# Patient Record
Sex: FEMALE | Race: WHITE | NOT HISPANIC OR LATINO | Employment: FULL TIME | URBAN - METROPOLITAN AREA
[De-identification: names, ages, dates, MRNs, and addresses within clinical notes are randomized per-mention and may not be internally consistent; named-entity substitution may affect disease eponyms.]

---

## 2018-06-27 ENCOUNTER — LAB REQUISITION (OUTPATIENT)
Dept: LAB | Facility: HOSPITAL | Age: 28
End: 2018-06-27

## 2018-06-27 DIAGNOSIS — Z00.00 ROUTINE GENERAL MEDICAL EXAMINATION AT A HEALTH CARE FACILITY: ICD-10-CM

## 2018-06-27 LAB — HCG INTACT+B SERPL-ACNC: 153 MIU/ML

## 2018-06-27 PROCEDURE — 84702 CHORIONIC GONADOTROPIN TEST: CPT | Performed by: OBSTETRICS & GYNECOLOGY

## 2018-06-29 ENCOUNTER — LAB REQUISITION (OUTPATIENT)
Dept: LAB | Facility: HOSPITAL | Age: 28
End: 2018-06-29

## 2018-06-29 DIAGNOSIS — Z00.00 ROUTINE GENERAL MEDICAL EXAMINATION AT A HEALTH CARE FACILITY: ICD-10-CM

## 2018-06-29 LAB — HCG INTACT+B SERPL-ACNC: 234 MIU/ML

## 2018-06-29 PROCEDURE — 84702 CHORIONIC GONADOTROPIN TEST: CPT | Performed by: OBSTETRICS & GYNECOLOGY

## 2018-07-03 ENCOUNTER — LAB REQUISITION (OUTPATIENT)
Dept: LAB | Facility: HOSPITAL | Age: 28
End: 2018-07-03

## 2018-07-03 DIAGNOSIS — Z00.00 ROUTINE GENERAL MEDICAL EXAMINATION AT A HEALTH CARE FACILITY: ICD-10-CM

## 2018-07-03 LAB
ALBUMIN SERPL-MCNC: 4.29 G/DL (ref 3.2–4.8)
ALBUMIN/GLOB SERPL: 1.4 G/DL (ref 1.5–2.5)
ALP SERPL-CCNC: 62 U/L (ref 25–100)
ALT SERPL W P-5'-P-CCNC: 10 U/L (ref 7–40)
ANION GAP SERPL CALCULATED.3IONS-SCNC: 7 MMOL/L (ref 3–11)
AST SERPL-CCNC: 16 U/L (ref 0–33)
BILIRUB SERPL-MCNC: 0.7 MG/DL (ref 0.3–1.2)
BUN BLD-MCNC: 11 MG/DL (ref 9–23)
BUN/CREAT SERPL: 12 (ref 7–25)
CALCIUM SPEC-SCNC: 9.2 MG/DL (ref 8.7–10.4)
CHLORIDE SERPL-SCNC: 103 MMOL/L (ref 99–109)
CO2 SERPL-SCNC: 29 MMOL/L (ref 20–31)
CREAT BLD-MCNC: 0.92 MG/DL (ref 0.6–1.3)
GFR SERPL CREATININE-BSD FRML MDRD: 74 ML/MIN/1.73
GLOBULIN UR ELPH-MCNC: 3 GM/DL
GLUCOSE BLD-MCNC: 83 MG/DL (ref 70–100)
POTASSIUM BLD-SCNC: 4.1 MMOL/L (ref 3.5–5.5)
PROT SERPL-MCNC: 7.3 G/DL (ref 5.7–8.2)
SODIUM BLD-SCNC: 139 MMOL/L (ref 132–146)

## 2018-07-03 PROCEDURE — 80053 COMPREHEN METABOLIC PANEL: CPT | Performed by: OBSTETRICS & GYNECOLOGY

## 2018-07-09 ENCOUNTER — LAB REQUISITION (OUTPATIENT)
Dept: LAB | Facility: HOSPITAL | Age: 28
End: 2018-07-09

## 2018-07-09 DIAGNOSIS — Z00.00 ROUTINE GENERAL MEDICAL EXAMINATION AT A HEALTH CARE FACILITY: ICD-10-CM

## 2018-07-09 LAB — HCG INTACT+B SERPL-ACNC: 955 MIU/ML

## 2018-07-09 PROCEDURE — 84702 CHORIONIC GONADOTROPIN TEST: CPT | Performed by: OBSTETRICS & GYNECOLOGY

## 2018-07-16 ENCOUNTER — LAB REQUISITION (OUTPATIENT)
Dept: LAB | Facility: HOSPITAL | Age: 28
End: 2018-07-16

## 2018-07-16 DIAGNOSIS — Z00.00 ROUTINE GENERAL MEDICAL EXAMINATION AT A HEALTH CARE FACILITY: ICD-10-CM

## 2018-07-16 LAB — HCG INTACT+B SERPL-ACNC: 391 MIU/ML

## 2018-07-16 PROCEDURE — 84702 CHORIONIC GONADOTROPIN TEST: CPT | Performed by: OBSTETRICS & GYNECOLOGY

## 2019-12-29 PROBLEM — J11.1 INFLUENZA: Status: ACTIVE | Noted: 2019-12-29

## 2019-12-29 PROBLEM — J01.00 ACUTE NON-RECURRENT MAXILLARY SINUSITIS: Status: ACTIVE | Noted: 2019-12-29

## 2019-12-29 PROBLEM — T78.40XA ALLERGIC REACTION: Status: ACTIVE | Noted: 2019-12-29

## 2019-12-29 PROBLEM — J01.90 ACUTE SINUSITIS: Status: ACTIVE | Noted: 2019-12-29

## 2021-02-08 PROCEDURE — U0004 COV-19 TEST NON-CDC HGH THRU: HCPCS | Performed by: FAMILY MEDICINE

## 2021-02-09 ENCOUNTER — TELEPHONE (OUTPATIENT)
Dept: URGENT CARE | Facility: CLINIC | Age: 31
End: 2021-02-09

## 2021-04-06 ENCOUNTER — OFFICE VISIT (OUTPATIENT)
Dept: OBSTETRICS AND GYNECOLOGY | Facility: CLINIC | Age: 31
End: 2021-04-06

## 2021-04-06 VITALS
TEMPERATURE: 97.3 F | DIASTOLIC BLOOD PRESSURE: 72 MMHG | HEIGHT: 70 IN | SYSTOLIC BLOOD PRESSURE: 110 MMHG | BODY MASS INDEX: 26.05 KG/M2 | WEIGHT: 182 LBS

## 2021-04-06 DIAGNOSIS — Z00.00 ANNUAL PHYSICAL EXAM: Primary | ICD-10-CM

## 2021-04-06 PROCEDURE — 99395 PREV VISIT EST AGE 18-39: CPT | Performed by: OBSTETRICS & GYNECOLOGY

## 2021-04-06 NOTE — PROGRESS NOTES
GYN Annual Exam     CC - Here for annual exam. Patient is a  Established patient.    Subjective   HPI  Kia Allan is a 30 y.o. female, , who presents for annual well woman exam.   Patient's last menstrual period was 2021 (exact date)..  Periods are regular and last 7 days.    Dysmenorrhea:severe, occurring premenstrually and first 1-2 days of flow.  Patient reports problems with: none.    Partner Status: Marital Status: .  New Partners since last visit: yes.  Desires STD Screening: no.  HPV vaccinated? : yes    Additional OB/GYN History   Current contraception: contraceptive methods: calendar method, ok if becomes pregnant  Desires to: continue contraception  Last Pap : 2018 negative  Last Completed Pap Smear       Status Date      PAP SMEAR Done 2018 Negative, GC/Chl Negative, Yeast +        History of abnormal Pap smear: yes - patient states she has a history of abnormal pap  Family history of uterine, colon, breast, or ovarian cancer: yes - MGGM  Performs monthly Self-Breast Exam: yes  Exercises Regularly:no  Feelings of Anxiety or Depression: no  Tobacco Usage?: No   OB History        3    Para        Term                AB   3    Living           SAB   2    TAB        Ectopic        Molar        Multiple        Live Births                    Health Maintenance   Topic Date Due   • Annual Gynecologic Pelvic and Breast Exam  Never done   • ANNUAL PHYSICAL  Never done   • HEPATITIS C SCREENING  Never done   • INFLUENZA VACCINE  2021   • PAP SMEAR  2021   • TDAP/TD VACCINES (2 - Td) 07/10/2029   • Pneumococcal Vaccine 0-64  Aged Out   • MENINGOCOCCAL VACCINE  Aged Out       The additional following portions of the patient's history were reviewed and updated as appropriate: allergies, current medications, past family history, past medical history, past social history, past surgical history and problem list.    Review of Systems   All other systems  "reviewed and are negative.      I have reviewed and agree with the HPI, ROS, and historical information as entered above. Cheryle Kaplan MD    Objective   /72   Temp 97.3 °F (36.3 °C)   Ht 177.8 cm (70\")   Wt 82.6 kg (182 lb)   LMP 03/20/2021 (Exact Date)   Breastfeeding No   BMI 26.11 kg/m²     Physical Exam    General:  well developed; well nourished  no acute distress  Skin:  No suspicious lesions seen  Thyroid: normal to inspection and palpation  Breasts:  Examined in supine position  Symmetric without masses or skin dimpling  Nipples normal without inversion, lesions or discharge  There are no palpable axillary nodes  CVS: RRR, no M/R/G, distal pulses wnl  Resp: CTAB, No W/R/R  Abdomen: soft, non-tender; no masses  no umbilical or inguinal hernias are present  no hepato-splenomegaly  Pelvis: Clinical staff was present for exam  External genitalia:  normal appearance of the external genitalia including Bartholin's and [place]'s glands.  Urethra: no masses or tenderness  Urethral meatus: normal size;  No lesions or signs of prolapse  Bladder: non tender to palpation, no masses, no prolapse  Vagina:  normal pink mucosa without prolapse or lesions.  Cervix:  normal appearance.  Uterus:  normal size, shape and consistency.  Adnexa:  normal bimanual exam of the adnexa.  Perineum/Anus: normal appearance, no external hemorrhoids  Ext: 2+ pulses, no edema    Assessment/Plan     Assessment     Problem List Items Addressed This Visit     Annual physical exam - Primary    Relevant Orders    Pap IG, Ct-Ng TV Rfx HPV ASCU          1. GYN annual well woman exam.     Plan     1. Reviewed Pap screening guidelines  2. Pap with HPV/G/C done  3. Reviewed monthly self breast exams.  Instructed to call with lumps, pain, or breast discharge.    4. Reviewed exercise and healthy diet as a preventative health measures.   5. RTC in 1 year or PRN with problems  6. Other: Encouraged PNVs as it sounds like will be ready to try " soon.      Cheryle Kaplan MD  04/06/2021

## 2021-04-13 DIAGNOSIS — Z00.00 ANNUAL PHYSICAL EXAM: ICD-10-CM

## 2021-04-22 ENCOUNTER — TELEPHONE (OUTPATIENT)
Dept: OBSTETRICS AND GYNECOLOGY | Facility: CLINIC | Age: 31
End: 2021-04-22

## 2021-04-22 DIAGNOSIS — N91.2 AMENORRHEA: Primary | ICD-10-CM

## 2021-09-20 ENCOUNTER — TELEPHONE (OUTPATIENT)
Dept: OBSTETRICS AND GYNECOLOGY | Facility: CLINIC | Age: 31
End: 2021-09-20

## 2021-09-20 ENCOUNTER — LAB (OUTPATIENT)
Dept: OBSTETRICS AND GYNECOLOGY | Facility: CLINIC | Age: 31
End: 2021-09-20

## 2021-09-20 DIAGNOSIS — Z87.59 HISTORY OF ECTOPIC PREGNANCY: ICD-10-CM

## 2021-09-20 DIAGNOSIS — Z34.90 PREGNANCY, UNSPECIFIED GESTATIONAL AGE: Primary | ICD-10-CM

## 2021-09-20 NOTE — TELEPHONE ENCOUNTER
"H/o MAB x 2 and ectopic in 2018. +UPT today. LMP 8/12/21. Mild cramping, \"stretching pains\". Denies bldg. MBT A positive per pt    Pt will come for HCG and progesterone today and repeat in 3 days. CB with bldg or severe pain.    "

## 2021-09-21 ENCOUNTER — TELEPHONE (OUTPATIENT)
Dept: OBSTETRICS AND GYNECOLOGY | Facility: CLINIC | Age: 31
End: 2021-09-21

## 2021-09-21 LAB
HCG INTACT+B SERPL-ACNC: NORMAL MIU/ML
PROGEST SERPL-MCNC: 18.6 NG/ML

## 2021-09-21 NOTE — TELEPHONE ENCOUNTER
Ok to go ahead and schedule new OB too please.  Beta high enough we should see something in uterus and with h/o ectopic, want to be sure IUP

## 2021-09-21 NOTE — TELEPHONE ENCOUNTER
S/w pt she states she has already s/w someone in our office in regards to lab results and has NOB appt scheduled.

## 2021-09-29 ENCOUNTER — INITIAL PRENATAL (OUTPATIENT)
Dept: OBSTETRICS AND GYNECOLOGY | Facility: CLINIC | Age: 31
End: 2021-09-29

## 2021-09-29 VITALS — SYSTOLIC BLOOD PRESSURE: 110 MMHG | WEIGHT: 189 LBS | BODY MASS INDEX: 27.12 KG/M2 | DIASTOLIC BLOOD PRESSURE: 72 MMHG

## 2021-09-29 DIAGNOSIS — Z34.81 PRENATAL CARE, SUBSEQUENT PREGNANCY, FIRST TRIMESTER: Primary | ICD-10-CM

## 2021-09-29 PROBLEM — J11.1 INFLUENZA: Status: RESOLVED | Noted: 2019-12-29 | Resolved: 2021-09-29

## 2021-09-29 PROBLEM — Z00.00 ANNUAL PHYSICAL EXAM: Status: RESOLVED | Noted: 2021-04-06 | Resolved: 2021-09-29

## 2021-09-29 PROCEDURE — 0501F PRENATAL FLOW SHEET: CPT | Performed by: OBSTETRICS & GYNECOLOGY

## 2021-09-29 RX ORDER — PROGESTERONE 100 MG/1
100 CAPSULE ORAL DAILY
Qty: 30 CAPSULE | Refills: 1 | Status: SHIPPED | OUTPATIENT
Start: 2021-09-29 | End: 2022-05-18 | Stop reason: HOSPADM

## 2021-09-29 RX ORDER — PRENATAL VIT/IRON FUM/FOLIC AC 27MG-0.8MG
TABLET ORAL DAILY
COMMUNITY

## 2021-09-29 RX ORDER — FERROUS SULFATE 325(65) MG
325 TABLET ORAL
COMMUNITY
End: 2022-05-18

## 2021-09-29 RX ORDER — ALBUTEROL SULFATE 90 UG/1
2 AEROSOL, METERED RESPIRATORY (INHALATION) EVERY 4 HOURS PRN
Qty: 8 G | Refills: 2 | Status: SHIPPED | OUTPATIENT
Start: 2021-09-29
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
## 2021-10-03 LAB
ABO GROUP BLD: NORMAL
AMPHETAMINES UR QL SCN: NEGATIVE NG/ML
BACTERIA UR CULT: NORMAL
BACTERIA UR CULT: NORMAL
BARBITURATES UR QL SCN: NEGATIVE NG/ML
BASOPHILS # BLD AUTO: 0 X10E3/UL (ref 0–0.2)
BASOPHILS NFR BLD AUTO: 1 %
BENZODIAZ UR QL SCN: NEGATIVE NG/ML
BLD GP AB SCN SERPL QL: NEGATIVE
BZE UR QL SCN: NEGATIVE NG/ML
C TRACH RRNA SPEC QL NAA+PROBE: NEGATIVE
CANNABINOIDS UR QL SCN: NEGATIVE NG/ML
CREAT UR-MCNC: 41.1 MG/DL (ref 20–300)
EOSINOPHIL # BLD AUTO: 0.1 X10E3/UL (ref 0–0.4)
EOSINOPHIL NFR BLD AUTO: 1 %
ERYTHROCYTE [DISTWIDTH] IN BLOOD BY AUTOMATED COUNT: 12.2 % (ref 11.7–15.4)
HBV SURFACE AG SERPL QL IA: NEGATIVE
HCT VFR BLD AUTO: 39.4 % (ref 34–46.6)
HCV AB S/CO SERPL IA: <0.1 S/CO RATIO (ref 0–0.9)
HGB BLD-MCNC: 13.1 G/DL (ref 11.1–15.9)
HIV 1+2 AB+HIV1 P24 AG SERPL QL IA: NON REACTIVE
IMM GRANULOCYTES # BLD AUTO: 0 X10E3/UL (ref 0–0.1)
IMM GRANULOCYTES NFR BLD AUTO: 0 %
LABORATORY COMMENT REPORT: NORMAL
LYMPHOCYTES # BLD AUTO: 1.6 X10E3/UL (ref 0.7–3.1)
LYMPHOCYTES NFR BLD AUTO: 19 %
Lab: NORMAL
MCH RBC QN AUTO: 29.4 PG (ref 26.6–33)
MCHC RBC AUTO-ENTMCNC: 33.2 G/DL (ref 31.5–35.7)
MCV RBC AUTO: 88 FL (ref 79–97)
METHADONE UR QL SCN: NEGATIVE NG/ML
MONOCYTES # BLD AUTO: 0.7 X10E3/UL (ref 0.1–0.9)
MONOCYTES NFR BLD AUTO: 8 %
N GONORRHOEA RRNA SPEC QL NAA+PROBE: NEGATIVE
NEUTROPHILS # BLD AUTO: 6.1 X10E3/UL (ref 1.4–7)
NEUTROPHILS NFR BLD AUTO: 71 %
OPIATES UR QL SCN: NEGATIVE NG/ML
OXYCODONE+OXYMORPHONE UR QL SCN: NEGATIVE NG/ML
PCP UR QL: NEGATIVE NG/ML
PH UR: 7.3 [PH] (ref 4.5–8.9)
PLATELET # BLD AUTO: 305 X10E3/UL (ref 150–450)
PROPOXYPH UR QL SCN: NEGATIVE NG/ML
RBC # BLD AUTO: 4.46 X10E6/UL (ref 3.77–5.28)
RH BLD: POSITIVE
RPR SER QL: NON REACTIVE
RUBV IGG SERPL IA-ACNC: 4.14 INDEX
WBC # BLD AUTO: 8.6 X10E3/UL (ref 3.4–10.8)

## 2021-11-08 ENCOUNTER — ROUTINE PRENATAL (OUTPATIENT)
Dept: OBSTETRICS AND GYNECOLOGY | Facility: CLINIC | Age: 31
End: 2021-11-08

## 2021-11-08 VITALS — WEIGHT: 189.2 LBS | SYSTOLIC BLOOD PRESSURE: 118 MMHG | DIASTOLIC BLOOD PRESSURE: 68 MMHG | BODY MASS INDEX: 27.15 KG/M2

## 2021-11-08 DIAGNOSIS — Z3A.12 12 WEEKS GESTATION OF PREGNANCY: Primary | ICD-10-CM

## 2021-11-08 LAB
GLUCOSE UR STRIP-MCNC: NEGATIVE MG/DL
PROT UR STRIP-MCNC: NEGATIVE MG/DL

## 2021-11-08 PROCEDURE — 0502F SUBSEQUENT PRENATAL CARE: CPT | Performed by: OBSTETRICS & GYNECOLOGY

## 2021-11-08 RX ORDER — ERGOCALCIFEROL (VITAMIN D2) 10 MCG
400 TABLET ORAL DAILY
COMMUNITY
End: 2022-05-18

## 2021-11-08 RX ORDER — MULTIVIT WITH MINERALS/LUTEIN
250 TABLET ORAL DAILY
COMMUNITY
End: 2022-05-18

## 2021-11-08 NOTE — PROGRESS NOTES
OB FOLLOW UP    Kia Allan is a 31 y.o.  12w4d patient being seen today for her obstetrical follow up visit. Patient reports mild cramping for the past couple of days, but subsided last night. She also reports that she is having trouble eating meat due to the thought of it. She denies any other issues or concerns at today's visit.     Her prenatal care is complicated by (and status) :    Patient Active Problem List   Diagnosis   • Acute non-recurrent maxillary sinusitis   • Allergic reaction   • Prenatal care, subsequent pregnancy, first trimester   • 12 weeks gestation of pregnancy       ROS -   Patient Reports : cramping  Patient Denies: Loss of Fluid, Vaginal Spotting, Vision Changes and Headaches  Fetal Movement : absent      /68   Wt 85.8 kg (189 lb 3.2 oz)   LMP 2021 (Exact Date)   BMI 27.15 kg/m²     Ultrasound Today: no    EXAM:  Vitals: See prenatal flowsheet   Abdomen: See prenatal flowsheet   Urine glucose/protein: See prenatal flowsheet   Pelvic: See prenatal flowsheet     Assessment    Diagnoses and all orders for this visit:    1. 12 weeks gestation of pregnancy (Primary)  -     POC Urinalysis Dipstick  -     [id number] PLUS Core+SCA+ESS - Blood,        1. Pregnancy at 12w4d  2. Fetal status reassuring     Problem List Items Addressed This Visit     12 weeks gestation of pregnancy - Primary    Relevant Orders    POC Urinalysis Dipstick (Completed)    [id number] PLUS Core+SCA+ESS - Blood, (Completed)          Plan    1. Fetal movement/ Labor Precautions  2. Cannot get vaccination  Diet/exercise precautions  Discussed NIPT vs. 2nd trimester screen and patient wants [id number] today  Follow Up: Return in about 4 weeks (around 2021) for Next scheduled follow up.        Cheryle Kaplan MD  2021

## 2021-11-13 ENCOUNTER — E-VISIT (OUTPATIENT)
Dept: FAMILY MEDICINE CLINIC | Facility: TELEHEALTH | Age: 31
End: 2021-11-13

## 2021-11-13 PROCEDURE — BRIGHTMDVISIT: Performed by: NURSE PRACTITIONER

## 2021-11-13 NOTE — EXTERNAL PATIENT INSTRUCTIONS
Note   The common cold can be managed with over the counter medications, however, you will need to contact your OB-GYN to determine which medications will be acceptable to take during your pregnancy.   Diagnosis   Common cold   My name is Tarsha Lindsey, and I'm a healthcare provider at [facility]. I carefully reviewed the symptoms you reported in your interview, and I see that you may have a cold.   With the ongoing COVID-19 pandemic, it can be hard to tell the difference between the common cold and a COVID-19 infection. Many cold symptoms are the same as COVID-19 symptoms. Most people with either illness have mild to moderate symptoms and can rest at home until they get better.   To prevent the spread of illness to others, I recommend that you stay home and away from other people as much as possible while you're sick.   Everyone 5 years of age and older can now get a COVID-19 vaccination.   I encourage you to get the COVID-19 vaccine as soon as possible. COVID-19 vaccines are SAFE and EFFECTIVE. Getting vaccinated against COVID-19 is the best way to protect yourself, your loved ones, and your community.   Since December 2020, more than 357 million doses of the COVID-19 vaccine have been given in the [place]. Serious side effects are extremely rare, and no long-term side effects have been reported. If you still have questions or concerns about the COVID-19 vaccine, please speak with a healthcare provider.   For more information about how to get a COVID-19 vaccine, visit [facility]'s website. To find a COVID-19 vaccination site near you, call [phone], or text your zip code to 979101 ([email or web address]). Message and data rates may apply.   Here are the main things to know about your current symptoms:    Colds get better on their own.    You can use the medications I recommend here to help ease your symptoms.   Based on what you told me in your interview, I haven't prescribed any antibiotics. Antibiotics  fight bacteria, not viruses. They don't help when you have a viral infection like the common cold. Antibiotics could even make you feel worse as they can cause or worsen nausea, diarrhea, and stomach pain. The following factors suggest that a virus is causing your symptoms:    You've had symptoms for less than 10 days. A bacterial infection is suspected when you've had symptoms longer than 10 days without improvement.    In addition to your sore throat, you have other cold symptoms like a stuffy nose or cough. This suggests a viral infection, rather than a bacterial infection such as strep throat.   Medications won't cure the cold. However, they may help you feel better while your immune system fights the virus.   Pregnancy and breastfeeding   The medications in this treatment plan are safe to take while pregnant.   About your diagnosis   The common cold is a viral infection of the respiratory tract, which includes the nose, throat, breathing passages, and lungs. These are the key things to know about colds:    There is no vaccine to prevent colds and no cure for a cold.    Some medications can lessen your symptoms.    You can spread a cold to other people.    Over 200 different viruses can cause the common cold. That's why you can get another cold after you've just had one.   Common symptoms include low-grade fever, sneezing, runny nose, congestion, sore throat, and cough. You may also notice fatigue, body aches, difficulty sleeping, or decreased appetite.   What to expect   You should feel better within 5 to 7 days and be back to normal within 14 days.   You can return to your normal activities when ALL of the following are true:    You've been fever-free for more than 24 hours without using fever-reducing medications such as Tylenol    Your other symptoms have improved    It's been at least 10 days since your symptoms first started   When to seek care   Call us at [phone]   with any sudden or unexpected  symptoms.    Fever that measures over 103F or continues for more than 3 days.    A worsening headache.    Your throat pain worsens and makes swallowing extremely difficult or impossible.    Your hoarse voice or loss of voice lasts longer than 2 weeks.    Your sinus pain continues for 10 days or more, without improvement.    More than 5 episodes of diarrhea in a day.    More than 5 episodes of vomiting in a day.    Coughing up red or bloody mucus.    Severe shortness of breath.    Severe stomach pain.    Symptoms that get better for a few days, and then suddenly get worse.    Severe chest pain   Other treatment    Rest! Your body needs rest to recover and fight the virus.    Drink plenty of water to stay hydrated.    Use a clean humidifier or a cool-mist vaporizer in your room at night. Breathing humid air may help with nasal congestion and cough.    Try non-prescription saline nasal sprays to help your nasal symptoms.    Try using a Neti Pot to flush out your stuffy nose and sinuses. Neti Pots are available at any drugstore without a prescription.    Gargle with salt water several times a day to help your throat feel better. Cough drops and throat lozenges may provide extra relief. A teaspoon of honey stirred into warm water or weak tea can help soothe a sore throat and cough.    Avoid smoke and air pollution. Smoke can make infections worse.   Prevention    Avoid close contact with other people when you're sick.    Cover your mouth and nose when you cough or sneeze. Use a tissue or cough into your elbow. Make sure that used tissues go directly into the trash.    Avoid touching your eyes, nose, or mouth while you're sick.    Wash your hands often, especially after coughing, sneezing, or blowing your nose. If soap and water are not available, use an alcohol-based hand .    If you or someone in your home or workplace is sick, disinfect commonly used items. This includes door handles, tables, computers,  remotes, and pens.    Coronavirus (COVID-19) information   Common symptoms of COVID-19 include fever, cough, shortness of breath, fatigue, muscle or body aches, headaches, new loss of sense of taste or smell, sore throat, stuffy or runny nose, nausea or vomiting, and diarrhea. Most people who get COVID-19 have mild symptoms and can rest at home until they get better. Elderly people and those with chronic medical problems may be at risk for more serious complications.   FAQs about the COVID-19 vaccine   There are three authorized COVID-19 vaccines: [employer] Laurent Vaccine (J&J/Laurent), Moderna, and Pfizer-BioNTech (Pfizer). The J&J/Laurent and Moderna vaccines are approved for use in people aged 18 and older. The Pfizer vaccine is approved for those aged 5 and older. All three are available at no cost. Even if you don't have health insurance, you can still get the COVID-19 vaccine for free.   Which vaccine is the best? Which vaccine should I get?   All three vaccines are highly effective. Even if you get COVID after being vaccinated, all of the vaccines help prevent severe disease, hospitalization, and complications.   Most people should get whichever vaccine is first available to them. However, women younger than 50 years old should consider the rare risk of blood clots with low platelets after vaccination with the J&J/Laurent vaccine. This risk hasn't been seen with the other two vaccines.   Are the vaccines safe?   Yes. Hundreds of millions of people in the [place] have already safely received COVID-19 vaccines. As part of Phase 3 clinical trials in the [place] and other countries, researchers collected safety and efficacy data for all three vaccines. These clinical trials follow strict standards. Before a vaccine is approved, the manufacturing company must submit data to the Food and Drug Administration (FDA) for review. Tens of thousands of volunteers participated in the clinical trials for the vaccines. The  FDA continues to monitor safety data as the vaccines are given to the general population.   Do I need the vaccine if I've already had COVID?   Yes. Vaccination helps protect you even if you've already had COVID.   If you had COVID-19 and had symptoms, wait to get vaccinated until ALL of the following are true:    10 days since your symptoms started    24 hours with no fever, without the use of fever-reducing medications    Your other COVID-19 symptoms are improving   If you tested positive for COVID-19 but did not have symptoms, you can get vaccinated after 10 days have passed since you had a positive test, as long as you don't develop symptoms.   If you had COVID-19 and were treated with monoclonal antibodies, you should wait 90 days before getting a COVID-19 vaccination.   How many doses of the vaccine do I need?   J&J/Laurent: one dose.   Moderna: two doses, spaced 4 weeks apart.   Pfizer vaccine: two doses, spaced 3 weeks apart.   When am I considered fully vaccinated?   J&J/Laurent: 14 days after you get the shot.   Moderna: 14 days after your second dose.   Pfizer: 14 days after your second dose.   What if I miss the second dose of the Moderna or Pfizer vaccine?   Contact your healthcare provider to discuss your options. While one dose of the vaccine may provide some protection against COVID, you need both doses for maximum protection.   What are the common side effects of the vaccine?   A sore arm, tiredness, headache, and muscle pain may occur within two days of getting the vaccine and last a day or two. For the Moderna or Pfizer vaccines, side effects are more common after the second dose. People over the age of 55 are less likely to have side effects than younger people.   After I'm fully vaccinated, can I still get or spread COVID?   Yes, but your disease should be milder, and your risk of serious illness, hospitalization, and complications will be much lower. And being vaccinated reduces the risk of  "spreading the disease if you get it.   After I'm fully vaccinated, can I go back to normal?    You should still wear a mask indoors in public if:    It's required by laws, rules, regulations, or local guidance.    You have a weakened immune system.    Your age puts you at increased risk of severe disease.    You have a medical condition that puts you at increased risk of severe disease.    Someone in your household has a weakened immune system, is at increased risk for severe disease, or is unvaccinated.    You're in an area of high transmission.    For travel information, see the CDC's latest guidance  .    Even after you're fully vaccinated, you should still:    Get tested and stay away from others if you develop symptoms of COVID-19.    Stay home and away from other private or public settings if you've tested positive for COVID-19 in the previous 10 days.    Continue to follow any applicable laws, rules, and regulations.    If you're exposed to COVID-19 after being fully vaccinated, you should get tested 3 to 5 days after exposure, even if you don't have symptoms. Wear a mask indoors in public for 14 days following exposure, or until you've confirmed your test result is negative. If you test positive for COVID-19, you should isolate at home for 10 days.   I'm fully vaccinated but have heard about a \"third dose\" and \"fourth dose.\" Do these apply to me?   Third and fourth doses are needed for some immunocompromised people.   You should get a third dose if ALL of the following are true:    You have a moderately to severely weakened immune system    You've had two doses of the Moderna or Pfizer vaccine    It's been at least 28 days since your second dose   You should get a fourth dose if ALL of the following are true:    You have a moderately to severely weakened immune system    You've had three doses of the Moderna or Pfizer vaccine    It's been at least 6 months since your third dose   If any of these situations " "apply, you have a moderately to severely weakened immune system:    You're getting active cancer treatment for a cancer or tumor of the blood    You've had an organ transplant and are taking medicine to suppress your immune system    You've had a stem cell transplant within the last 2 years    You're taking medicine to suppress your immune system, such as high-dose corticosteroids    You have moderate to severe primary immunodeficiency (such as DiGeorge syndrome, Wiskott-Moni syndrome)    You have advanced or untreated HIV infection   If you think you need a third or fourth dose, speak with your care team.   I'm fully vaccinated but have heard about \"boosters.\" Do these apply to me?   A booster shot is a \"top-up\" for people whose immunity from vaccination may have lessened over time.   If you got the [employer] vaccine AND it's been at least 2 months since your shot, you should get a booster shot.   If you got the Pfizer or Moderna vaccine AND it's been at least 6 months since your second dose, you should get a booster shot if:    You're 65 or older    You're 18 or older and live in a long-term care facility    You're 18 or older and have an underlying medical condition, such as:    Cancer    Chronic kidney disease    Chronic lung disease (COPD, moderate to severe asthma, interstitial lung disease, cystic fibrosis, or pulmonary hypertension)    Dementia or other neurological condition    Diabetes    Down syndrome    Heart condition, including heart failure, coronary artery disease, cardiomyopathies, or hypertension    HIV infection    A weakened immune system    Chronic liver disease    A mental health condition, including depression and schizophrenia spectrum disorders    Obesity    Pregnancy    Sickle cell disease or thalassemia    Smoking, current or former    Solid organ or blood stem cell transplant    Stroke or cerebrovascular disease    Substance use disorder    You're 18 or older and work or live in " high-risk settings. This includes:    First responders (healthcare workers, firefighters, police)    Education staff (teachers, support staff,  workers)    Food and agriculture workers    Manufacturing workers    Corrections workers    [employer] workers    Public transit workers    Grocery store workers   If you need a booster shot, you can choose which vaccine to get. You can get the kind you originally received, or you can get a different kind. New CDC recommendations allow for mix-and-match dosing for booster shots. However, women younger than 50 years old should consider the rare risk of blood clots with low platelets after vaccination with the J&J/Laurent vaccine. This risk hasn't been seen with the other two vaccines.   If you think you need a booster shot, speak with your care team.   General information about COVID-19   What should I do if I'm exposed to someone with COVID-19?   In general, you need to be in close contact with someone who has COVID-19 to get infected. Close contact means:    Living in the same household as someone with COVID-19.    Caring for someone with COVID-19.    Being within 6 feet of someone with COVID-19 for a total of at least 15 minutes over a 24-hour period.    Being in direct contact with respiratory droplets from someone with COVID-19 (for example, being coughed on, kissing, or sharing utensils).   If you're exposed and not fully vaccinated:    Self-quarantine in your home for 14 days after your last known contact with the infected person. Because you can spread the disease before you have symptoms, it's very important that you stay home AT ALL TIMES, unless you need medical care. Don't go to work, school, or public places, including grocery stores and pharmacies. Avoid public transportation, ride-sharing, and taxis.    Watch for the common symptoms of COVID-19: fever, cough, shortness of breath, fatigue, muscle or body aches, headache, new loss of sense of taste  or smell, sore throat, stuffy or runny nose, nausea or vomiting, and diarrhea.   What if I develop symptoms of COVID-19?   CALL your healthcare provider or clinic right away to discuss next steps if you have any of the following risk factors:    Age 65 or older    Pregnant    Chronic medical condition such as diabetes, liver disease, kidney disease requiring dialysis, heart disease, high blood pressure, severe obesity, or lung disease (including moderate to severe asthma)    A medical condition that affects your immune system    Taking a medication that affects your immune system   Otherwise, if your symptoms are mild, you don't need to call your healthcare provider or be seen for an exam. You can recover at home and should feel better within a few weeks. Because COVID-19 is highly contagious, it's important that you avoid close contact with others while you're recovering. This means staying home AT ALL TIMES, unless you need medical care. Don't go to work, school, or public places, including grocery stores and pharmacies. Avoid public transportation, ride-sharing, and taxis.   There are currently no specific medications to treat this infection. Over-the-counter cold medications can help ease symptoms.   To prevent the spread of COVID-19 to the people and animals in your household:    Stay in a specific room away from other people in your home, and use a separate bathroom if possible.    Wear a mask when close contact with household members can't be avoided.   You can return to your normal activities when ALL of the following are true:    Your symptoms have improved    It's been at least 10 days since your symptoms first started    You've been fever-free for more than 24 hours without using fever-reducing medications such as Tylenol   When to get care   Call your healthcare provider immediately if you have any of the following:    Fever over 103F    Fever that doesn't come down after taking medications such as  Tylenol or ibuprofen    Fever that returns after being gone for more than 24 hours    Fever lasting more than 4 days    Worsening shortness of breath or difficulty breathing   Go to your nearest ER or call 911 if you have any of the following:    Shortness of breath that makes it hard to do simple things like get dressed, bathe, or comb your hair    Persistent chest pain or chest tightness    New confusion or difficulty staying alert    Bluish color to the lips or face    Flu vaccine information   Getting a flu vaccine this year is more important than ever. The vaccine not only protects you and the people around you from the flu, it also helps reduce the strain on healthcare systems responding to the COVID-19 pandemic.   Who should get a flu vaccine?   Everyone 6 months of age and older should get a yearly flu vaccine.   When should I get vaccinated?   You should get a flu vaccine by the end of October. Once you're vaccinated, it takes about two weeks for antibodies to develop and protect you against the flu. That's why it's important to get vaccinated as soon as possible.   After October, is it too late to get vaccinated?   No. You should still get vaccinated. As long as the flu viruses are still in your community, flu vaccines will remain available, even into January of next year or later.   Why do I need a flu vaccine EVERY year?   Flu viruses are constantly changing, so flu vaccines are usually updated from one season to the next. Your protection from the flu vaccine also lessens over time.   Is the flu vaccine safe?   Yes. Over the last 50 years, hundreds of millions of Americans have safely received the flu vaccines.   What are the side effects of flu vaccines?   You CANNOT get the flu from a flu vaccine. Common side effects of the flu shot include soreness, redness and/or swelling where the shot was given, low grade fever, and aches. Common side effects of the nasal spray flu vaccine for adults include runny  nose, headaches, sore throat, and cough. For children, side effects include wheezing, vomiting, muscle aches, and fever.   Does the flu vaccine increase your risk of getting COVID-19?   No. There is no evidence that getting a flu vaccine increases your risk of getting COVID-19.   Is it safe to get the flu vaccine along with a COVID-19 vaccine?   Yes. It's safe to get the flu vaccine with a COVID vaccine or booster.   Contact your healthcare provider TODAY for details on when and where to get your flu vaccine.   Your provider   Your diagnosis was provided by Tarsha Lindsey, a member of your trusted care team at [facility].   If you have any questions, call us at [phone]  .

## 2021-11-13 NOTE — E-VISIT TREATED
Chief Complaint: Coronavirus (COVID-19), cold, sinus pain, allergy, or flu   Patient introduction   Patient is 31-year-old female who reports congestion, rhinitis, sore throat, and voice hoarseness that started 3-5 days ago.   Patient has not requested COVID testing.   Coronavirus Disease 2019 (COVID-19) exposure, testing history, and vaccination status:    No known exposure to a confirmed or suspected case of COVID-19.    No recent travel outside of their local community.    Patient had a viral test > 3 months ago. Test result was negative.    Has not received a COVID-19 vaccination.   Warning. The following may warrant further investigation:    Pregnancy    Asthma   When asked why they're seeking care online today, patient reports they just want to feel better.   Patient-submitted comments:   Patient writes: *I'm allergic to Sulfa (didn't see it on the list). It's my yearly sinus infection but if I don't catch it then it will go into bronchitis. Thick green mucus with congestion. No fever. Slight cough (mostly when I feel stuff going down my throat) and the sore throat. Fluid intake and out put normal.   I am 13 weeks pregnant. Taking daily prenatal.*.   Patient did not request an excuse note.   General presentation   Symptoms came on gradually.   Fever:    Denies fever.   Sinus and nasal symptoms:    Reports rhinitis.    Reports green nasal drainage.    Nasal drainage is thick.    Reports postnasal drip.    Reports 1-3 episodes of antibiotic treatment for sinus infection in the last year.    Reports congestion with sinus pain or pressure on or around their cheeks.    Patient first noticed sinus pain < 5 days ago.    Sinus pain is not worse with Valsalva.    Denies itchy nose or sneezing.    Denies history of unhealed nasal septal ulcer/nasal wound.    Denies history of deviated septum or nasal polyps.   Sore throat:    Reports sore throat.    Patient does not think they have strep.    Reports discomfort when  swallowing but affirms ability to swallow liquids and solid foods.    Reports moderate hoarseness. Patient doesn't believe hoarseness is due to voice strain.   Head and body aches:    Denies headache.    Denies sweats.    Denies chills.    Denies myalgia.    Denies fatigue.   Dizziness:    Denies dizziness.   Cough:    Denies cough.   Wheezing and SOB:    Reports having asthma.    Reports wheezing.    Reports using an albuterol inhaler for asthma.    Reports using albuterol every 6 hours or longer.    Denies COPD diagnosis.    Denies shortness of breath.    Denies current cold symptoms aggravate their asthma.   Chest pain:    Denies chest pain.   Allergies:    Reports history of allergies.    Patient does not think symptoms are allergy-related.    Patient has known seasonal allergies and known dust allergies.   Flu exposure:    Denies receiving a flu vaccine this season.   Patient denies the following red flags:    Changes in alertness or awareness    Symptoms suggesting respiratory distress    Symptoms suggesting airway obstruction    Decreased urination   Risk factors for antibiotic resistance:    Antibiotic use for similar symptoms within the last 30 days   Pregnancy/menstrual status/breastfeeding:    Patient is pregnant    Denies breastfeeding   Self-exam:    No difficulty moving their chin toward their chest    Tonsillar edema    Tonsils appear normal    No palatal petechiae   Reports antibiotic treatment for similar symptoms within the past month.   Current medications   Reports taking over-the-counter medication for current symptoms. Patient has taken guaifenesin/dextromethorphan.   Reports taking FLUORIDE/IRON/VITAMIN A/VITAMIN C/VITAMIN D.   Medication contraindication review   Because of patient's reported pregnancy/menstrual status, the following medication(s) will not be prescribed: baloxavir, doxycycline hyclate, doxycycline monohydrate, fluconazole, ibuprofen,  acetaminophen-diphenhydramine-phenylephrine, and aspirin-chlorpheniramine-phenylephrine.   Denies history of anaphylactic reaction to beta-lactam antibiotics; aspirin triad; blood dyscrasia; bone marrow depression; catecholamine-releasing paraganglioma; coronary artery disease; coagulation disorder; congenital long QT syndrome; depression; electrolyte abnormalities; fungal infection; GI bleeding; GI obstruction; G6PD deficiency; heart arrhythmia; hypertension; kidney disease or hemodialysis; mononucleosis; myasthenia; recent myocardial infarction; NSAID-induced asthma/urticaria; Parkinson's disease; pheochromocytoma; porphyria; Reye syndrome; seizure disorder; ulcerative colitis; and urinary retention.   Denies history of metoclopramide-associated dystonic reaction and tardive dyskinesia.   No known history of amoxicillin-clavulanate-associated cholestatic jaundice or hepatic impairment.   No known history of azithromycin-associated cholestatic jaundice or hepatic impairment.   Past medical history   Immune conditions: Denies immunocompromising conditions. Denies history of cancer.   Social history   Former smoker.   Assessment   Acute nasopharyngitis (common cold). Ruled out: Traumatic laryngitis.   [facility] required information for COVID-19 lab data reporting (if test is ordered):   Symptoms:    Sore throat    Nasal congestion    Rhinitis   Symptom onset: 3-5 days ago ago  Pregnancy: Patient is pregnant  Healthcare worker? No  Resident in a congregate care setting? No  Previous history of COVID-19 testing: Patient had a viral test > 3 months ago. Test result was negative.     Plan   Medications:   No medications prescribed.   Education:    Condition and causes    Prevention    Treatment and self-care    When to call provider      ----------   Electronically signed by BAY Smith on 2021-11-13 at 05:13AM   ----------   Patient Interview Transcript:   Why are you getting care through eVisit today? We can't  guarantee a specific treatment or test. Your provider will decide what's best for you. You'll have a chance to tell us more at the end of the interview. Select all that apply.    I just want to feel better!   Not selected:    I want a specific treatment or medication    I want to know if I have a cold or something more serious    I want to know if I need to be seen by a provider    I need a doctor's note    I want to be tested for COVID-19    I want to get the COVID-19 vaccine    I think I'm having side effects from the COVID-19 vaccine    None of the above   Which of these symptoms are bothering you? Select all that apply.    Stuffed-up nose or sinuses    Runny nose    Sore throat    Hoarse voice or loss of voice   Not selected:    Cough    Shortness of breath    Fever    Itchy or watery eyes    Itchy nose or sneezing    Loss of smell or taste    Headache    Sweats    Chills    Muscle or body aches    Fatigue or tiredness    Nausea or vomiting    Diarrhea    I don't have any of these symptoms   Before we learn more about why you're here, we'll get some information related to COVID-19. We'll ask about risk factors, testing, vaccination status, and exposure. Do you have any of these conditions? If so, you may be at increased risk for complications from COVID-19. Select all that apply.    None of the above   Not selected:    Chronic lung disease, such as cystic fibrosis or interstitial fibrosis    Heart disease, such as congenital heart disease, congestive heart failure, or coronary artery disease    Disorder of the brain, spinal cord, or nerves and muscles, such as dementia, cerebral palsy, epilepsy, muscular dystrophy, or developmental delay    Metabolic disorder or mitochondrial disease    Cerebrovascular disease, such as stroke or another condition affecting the blood vessels or blood supply to the brain   Do you live in a group care setting? Examples include: - Nursing home - Residential care - Psychiatric  "treatment facility - Group home - Dormitory - Board and care home - Homeless shelter - Foster care setting Select one.    No   Not selected:    Yes   Have you ever been tested for COVID-19? Select one.    Yes   Not selected:    No   When was your most recent COVID-19 test? Select one.    More than 3 months ago   Not selected:    Within the last week    7 to 14 days ago    15 to 30 days ago    1 to 3 months ago   What type of COVID-19 test did you have? There are 2 types of COVID-19 tests: - Viral tests check if you're currently infected with COVID-19. - Antibody tests check if you've been infected in the past. Select one.    Viral test for current infection   Not selected:    Antibody test for past infection   What was the result of your most recent COVID-19 test? Select one.    Negative (no sign of infection)   Not selected:    Positive (signs of current or past infection)    I'm not sure   Have you gotten the COVID-19 vaccine? Select one.    No   Not selected:    Yes   In the last 14 days, have you traveled outside of your local community? This includes travel by car, RV, bus, train, or plane. Travel increases your chances of getting and spreading COVID-19. Select one.    No   Not selected:    Yes   In the last 14 days, have you had close contact with someone who has coronavirus (COVID-19)? \"Close contact\" means any of these: - Living in the same household as someone with COVID-19. - Caring for someone with COVID-19. - Being within 6 feet of someone with COVID-19 for a total of at least 15 minutes over a 24-hour period. For example, three 5-minute exposures for a total of 15 minutes. - Being in direct contact with respiratory droplets from someone with COVID-19 (being coughed on, kissing, sharing utensils). Select one.    No, not that I know of   Not selected:    Yes, a confirmed case    Yes, a suspected case   Thanks for completing our COVID-19 questions. Now we'll return to your symptoms. When did your symptoms " "start? If you know the exact date your symptoms started, choose Other and enter the month and day. Select one.    3 to 5 days ago   Not selected:    Less than 48 hours ago    6 to 9 days ago    10 to 14 days ago    2 to 4 weeks ago    More than a month ago    Other (specify)   Did your symptoms come on suddenly or gradually? Select one.    Gradually   Not selected:    Suddenly    I'm not sure   You mentioned having a stuffy nose or sinus congestion. Do you feel pain or pressure in your sinuses?    Yes   Not selected:    No    I'm not sure   Where do you feel sinus pain or pressure?    In my cheeks   Not selected:    In my forehead    Around my eyes    Behind my nose    In my upper teeth or jaw    I'm not sure   When did you first notice your sinus pain or pressure? Select one.    Less than 5 days ago   Not selected:    5 to 9 days ago    10 to 14 days ago    2 to 4 weeks ago    1 month ago or longer   Does coughing, sneezing, or leaning forward make your sinuses feel worse? Select one.    No   Not selected:    Yes    I'm not sure   What color is your nasal drainage? Select one.    Green   Not selected:    Clear    White    Yellow    My nose is stuffed but not draining or running    I'm not sure   Is your nasal drainage thick or thin? Select one.    Thick   Not selected:    Thin    I'm not sure   Is there any drainage (mucus) going down the back of your throat? This kind of drainage is also called \"postnasal drip.\" Select one.    Yes   Not selected:    No    I'm not sure   Can you swallow liquids and solid foods? A sore throat may be painful when swallowing, but it shouldn't prevent you from swallowing. Select one.    Yes, but it's uncomfortable   Not selected:    Yes, with ease    Yes, but it's painful    It's hard to swallow anything because it feels like liquids and food get stuck in my throat    No, I can't swallow anything, liquid or solid foods   Since your symptoms started, have you felt dizzy? Select one.   "  No   Not selected:    Yes, but I can continue with my regular daily activities    Yes, and it makes it hard to stand, walk, or do daily activities   Do you have chest pain? You might also feel it as discomfort, aching, tightness, or squeezing in the chest. Select one.    No   Not selected:    Yes   Have you urinated at least 3 times in the last 24 hours? Select one.    Yes   Not selected:    No    I'm not sure   Changes in alertness or awareness may mean you need emergency care. Since your symptoms started, have you had any of these? Select all that apply.    None of the above   Not selected:    Confusion    Slurred speech    Not knowing where you are or what day it is    Difficulty staying conscious    Fainting or passing out   Do your symptoms include a whistling sound, or wheezing, when you breathe? Select one.    Yes   Not selected:    No    I'm not sure   Early in this interview, you told us you were hoarse or you'd lost your voice. How would you describe the changes to your voice? Select one.    It's harder than usual to talk   Not selected:    It just sounds a little raspy    I can barely talk at all   Is it possible that you strained your voice? Singing, yelling, or talking more or louder than usual can cause voice strain. Select one.    No   Not selected:    Yes    I'm not sure   Do you have any of these symptoms in your ear(s)? Select all that apply.    Crackling or popping    Plugged or blocked sensation   Not selected:    Pain    Pressure    Fullness    None of the above   Can you move your chin toward your chest?    Yes   Not selected:    No, my neck is too stiff   Are your tonsils larger than usual?    Yes   Not selected:    No    I've had my tonsils removed    I'm not sure   Is there any white or yellow pus on your tonsils?    No   Not selected:    Yes    I'm not sure   Are there red spots on the roof of your mouth or the back of your throat?    No   Not selected:    Yes    I'm not sure   Are your  glands/lymph nodes swollen, or does it hurt when you touch them?    I'm not sure   Not selected:    Yes    No   People with a very high body mass index (BMI) are at higher risk for developing complications from the flu and severe illness from COVID-19. To determine your BMI, we need to know your weight and height. Please enter your weight (in pounds).    Weight   Please enter your height.    Height   In the past 2 weeks, has anyone around you (such as at school, work, or home) had a confirmed diagnosis of strep throat? A confirmed diagnosis means that a throat swab and lab test were done to verify a strep throat infection. Select one.    I'm not sure   Not selected:    Yes    No   Do you think you might have strep throat? Select one.    No   Not selected:    Yes    I'm not sure   In the past week, has anyone around you (such as at school, work, or home) had a confirmed diagnosis of the flu? A confirmed diagnosis means that a nose swab was done to verify a flu infection. Select all that apply.    I'm not sure   Not selected:    I live with someone who has the flu    I've been within touching distance of someone who has the flu    I've walked by, or sat about 3 feet away from, someone who has the flu    I've been in the same building as someone who has the flu    No   Have you ever been diagnosed with asthma? Select one.    Yes   Not selected:    No   Are your cold symptoms making your asthma worse? Select one.    No   Not selected:    Yes   What medications or inhalers are you currently using for your asthma? Select all that apply.    Albuterol inhaler, as needed (such as ProAir, Proventil, Ventolin)   Not selected:    Inhaled steroid (such as Qvar, Pulmicort, Flovent)    Inhaled steroid with long-acting bronchodilator (such as Advair, Dulera, Symbicort)    I'm not sure    I'm not taking any medications for my asthma    I usually take medications for my asthma, but I ran out   How often are you using albuterol? If  you're using albuterol very frequently, you'll need to be seen in person. Select one.    Every 6 hours or longer   Not selected:    More than once an hour    Every 1 to 2 hours    Every 2 to 3 hours    Every 3 to 4 hours    Every 4 to 6 hours   Would you like a prescription for albuterol? Select one.    No   Not selected:    Yes   Have you ever been diagnosed with chronic obstructive pulmonary disease (COPD)? Select one.    No   Not selected:    Yes    I'm not sure   In the last year, how many times were you treated with antibiotics for a sinus infection? Select one.    1 to 3 times   Not selected:    None    4 or more times   Have you been diagnosed with a deviated septum or nasal polyps? The nose is divided into two nostrils by the septum. A crooked septum is called a deviated septum. Nasal polyps are growths inside the nose or sinuses. Select one.    No   Not selected:    Yes, but I had surgery to treat them    Yes, I have a deviated septum    Yes, I have nasal polyps    Yes, I have a deviated septum and nasal polyps    I'm not sure   Do you have a sore inside your nose that won't heal? Select one.    No   Not selected:    Yes    I'm not sure   Do you have allergies (pollen, dust mites, mold, animal dander)? Select one.    Yes   Not selected:    No    I'm not sure   What kind of allergies do you have? Select all that apply.    Seasonal allergies (hay fever)    Dust allergies   Not selected:    Pet allergies    None of the above    I'm not sure   Do you think your symptoms could be allergy-related? Select one.    No   Not selected:    Yes    I'm not sure   Have you had a flu shot this season? Select one.    No   Not selected:    Yes, less than 2 weeks ago    Yes, 2 to 4 weeks ago    Yes, 1 to 3 months ago    Yes, 3 to 6 months ago    Yes, more than 6 months ago    I'm not sure   The flu and COVID-19 can be more serious for people with certain conditions or characteristics. These questions help us figure out if you  or anyone you live with is at higher risk for complications from these infections. Do either of these statements apply to you? Select all that apply.    None of the above   Not selected:    I'm  or Native [place]    I'm a healthcare worker   Do you smoke tobacco? Select one.    No, I quit   Not selected:    Yes, every day    Yes, some days    No, never   Some conditions can put you at risk for more serious infections. Do any of these apply to you? Select all that apply.    None of the above   Not selected:    I've been hospitalized within the last 5 days    I have diabetes    I'm in close contact with a child in    Are you currently being treated for any of these conditions? Scroll to see all options. Select all that apply.    None of the above   Not selected:    Aspirin triad (also known as Samter's triad or ASA triad)    Asthma or hives from taking aspirin or other NSAIDs, such as ibuprofen or naproxen    Blockage or narrowing of the blood vessels of the heart    Blood dyscrasia, such anemia, leukemia, lymphoma, or myeloma    Bone marrow depression    Catecholamine-releasing paraganglioma    Blood clotting disorder    Congenital long QT syndrome    Depression    Difficulty urinating or completely emptying your bladder    Uncorrected electrolyte abnormalities    Fungal infection    Gastrointestinal (GI) bleeding    Gastrointestinal (GI) obstruction    G6PD deficiency    Recent heart attack    High blood pressure    Irregular heartbeat or heart rhythm    Kidney disease or hemodialysis    Mononucleosis (mono)    Myasthenia gravis    Parkinson's disease    Pheochromocytoma    Reye syndrome    Seizure disorder    Ulcerative colitis   Do you have any of these conditions that can affect the immune system? Scroll to see all options. Select all that apply.    None of these   Not selected:    History of bone marrow transplant    Chronic kidney disease    Chronic liver disease (including cirrhosis)     HIV/AIDS    Inflammatory bowel disease (Crohn's disease or ulcerative colitis)    Lupus    Moderate to severe plaque psoriasis    Multiple sclerosis    Rheumatoid arthritis    Sickle cell anemia    Alpha or beta thalassemia    History of solid organ transplant (kidney, liver, or heart)    History of spleen removal    An autoimmune disorder not listed here    A condition requiring treatment with long-term use of oral steroids (such as prednisone, prednisolone, or dexamethasone)   Have you ever been diagnosed with cancer? Select one.    No   Not selected:    Yes, I have cancer now    Yes, but I'm in remission   Have you ever had either of these conditions? Select all that apply.    No   Not selected:    Metoclopramide-associated dystonic reaction    Tardive dyskinesia   Do any of these apply to the people who live with you? Select all that apply.    None of the above   Not selected:    A child under the age of 5    An adult 65 or older    A person who is pregnant    A person who has given birth, had a miscarriage, had a pregnancy loss, or had an  in the last 2 weeks    An  or Native [place]   Does any member of your household have any of these medical conditions? Select all that apply.    None of the above   Not selected:    Asthma    Disorders of the brain, spinal cord, or nerves and muscles, such as dementia, cerebral palsy, epilepsy, muscular dystrophy, or developmental delay    Chronic lung disease, such as COPD or cystic fibrosis    Heart disease, such as congenital heart disease, congestive heart failure, or coronary artery disease    Cerebrovascular disease, such as stroke or another condition affecting the blood vessels or blood supply to the brain    Blood disorders, such as sickle cell disease    Diabetes    Metabolic disorders such as inherited metabolic disorders or mitochondrial disease    Kidney disorders    Liver disorders    Weakened immune system due to illness or medications  such as chemotherapy or steroids    Children under the age of 19 who are on long-term aspirin therapy    Extreme obesity (BMI > 40)   Are you pregnant? Select one.    Yes   Not selected:    No   Are you breastfeeding? Select one.    No   Not selected:    Yes   Just a few more questions about medications, and then you're finished. Have you used any non-prescription medications or nasal sprays for your current symptoms? Examples include saline sprays, decongestants, NyQuil, and Tylenol. Select one.    Yes   Not selected:    No   Which of these non-prescription medications have you tried? Scroll to see all options. Select all that apply.    Guaifenesin/dextromethorphan (Delsym DM, Mucinex DM, Robitussin DM)   Not selected:    Acetaminophen (Tylenol)    Budesonide (Rhinocort)    Cetirizine (Zyrtec)    Chlorpheniramine (Aller-chlor, Chlor-Trimeton)    Cromolyn (NasalCrom)    Dextromethorphan (Delsym, Robitussin, Vicks DayQuil Cough)    Diphenhydramine (Benadryl)    Fexofenadine (Allegra)    Fluticasone (Flonase)    Guaifenesin (Mucinex)    Ibuprofen (Advil, Motrin, Midol)    Ketotifen (Alaway, Zaditor)    Loratadine (Alavert, Claritin)    Naphazoline-pheniramine (Naphcon-A, Opcon-A, Visine-A)    Omeprazole (Prilosec)    Oxymetazoline (Afrin)    Phenylephrine (Sudafed)    Triamcinolone (Nasacort)    None of the above   In the past month, have you taken antibiotics for similar symptoms? Examples of antibiotics include amoxicillin, amoxicillin-clavulanate (Augmentin), penicillin, cefdinir (Omnicef), doxycycline, and clindamycin (Cleocin). Select one.    Yes   Not selected:    No    I'm not sure   Have you taken any monoamine oxidase inhibitor (MAOI) medications in the last 14 days? Examples include rasagiline (Azilect), selegiline (Eldepryl, Zelapar), isocarboxazid (Marplan), phenelzine (Nardil), and tranylcypromine (Parnate). Select one.    No   Not selected:    Yes    I'm not sure   Do you take Kynmobi or Apokyn  "(apomorphine)? Select one.    No   Not selected:    Yes    I'm not sure   Are you taking any other medications or supplements? On the next screen, you need to list all vitamins, supplements, non-prescription medications (such as aspirin or Aleve), and prescription medications that you're taking. Select one.    Yes   Not selected:    Yes, but I'm not sure what they are    No   Have you ever had an allergic or bad reaction to any medication? Select one.    Yes   Not selected:    No   Have you had an allergic or bad reaction to any of these medications? Select all that apply.    None of the above   Not selected:    Baloxavir (Xofluza)    Benzonatate (Tessalon Perles)    Fluconazole, itraconazole, or terconazole (brands include Diflucan, Sporanox, Terazol)    Oseltamivir (Tamiflu) or zanamivir (Relenza)    I'm not sure   Have you had an allergic or bad reaction to any of these antibiotic medications? Select all that apply.    None of the above   Not selected:    Penicillin or any \"-cillin\" antibiotic, such as amoxicillin, ampicillin, dicloxacillin, nafcillin, or piperacillin (Brands include Augmentin, Unasyn, and Zosyn)    Tetracycline or any \"-cycline\" antibiotic, such as doxycycline, demeclocycline, minocycline (Brands include Declomycin, Doryx, Dynacin, Oracea, Monodox, Panmycin, and Vibramycin)    Ciprofloxacin or any \"-floxacin\" antibiotic, such as gemifloxacin, levofloxacin, moxifloxacin, or ofloxacin (Brands include Factive, Cipro, Floxin, and Levaquin)    Cephalexin or any \"cef-\" antibiotic, such as cefazolin, cefdinir, cefuroxime, ceftriaxone, ceftazidime, or cefepime (Brands include Ancef, Ceftin, Fortaz, Keflex, Maxipime, Rocephin, and Simplicef)    Azithromycin or any \"-thromycin\" antibiotic, such as erythromycin or clarithromycin (Brands include Biaxin, Erythrocin, Z-johnathan, and Zithromax)    Clindamycin or lincomycin (Brands include Cleocin and Lincocin)    I'm not sure   Have you had an allergic or bad " reaction to any of these medications? Select all that apply.    None of the above   Not selected:    Albuterol or a similar medication    Corticosteroid (steroid) medication, including topical steroids, inhaled steroids, nasal steroids, or oral steroids (budesonide, ciclesonide, dexamethasone, flunisolide, fluticasone, methylprednisolone, triamcinolone, prednisone (or brand names Alvesco, Deltasone, Flovent, Medrol, Nasacort, Rhinocort, or Veramyst)    Metoclopramide (Reglan)    Ondansetron (Zuplenz, Zofran ODT, Zofran)    Prochlorperazine (Compazine)    I'm not sure   Have you had an allergic or bad reaction to any of these eye drops or nasal sprays? Scroll to see all options. Select all that apply.    None of the above   Not selected:    Azelastine (Astelin, Astepro, Optivar)    Cromolyn (Crolom, NasalCrom)    Ipratropium (Atrovent)    Ketotifen (Alaway, Zaditor)    Pheniramine/naphazoline (Naphcon-A, Opcon-A, Visine-A)    Olopatadine (Pataday, Patanol, Pazeo)    I'm not sure   Have you had an allergic or bad reaction to any of these non-prescription medications? Scroll to see all options. Select all that apply.    None of the above   Not selected:    Acetaminophen (Tylenol)    Aspirin    Cetirizine (Zyrtec)    Chlorpheniramine (Aller-chlor, Chlor-Trimeton)    Dextromethorphan (Delsym, Robitussin, Vicks DayQuil Cough)    Diphenhydramine (Benadryl)    Fexofenadine (Allegra)    Guaifenesin (Mucinex)    Guaifenesin/dextromethorphan (Delsym DM, Mucinex DM, Robitussin DM)    Ibuprofen (Advil, Motrin, Midol)    Loratadine (Alavert, Claritin)    Oxymetazoline (Afrin)    Phenylephrine (Sudafed)    I'm not sure   Are you allergic to milk or to the proteins found in milk (for example, whey or casein)? A milk allergy is different from lactose intolerance. Select one.    No   Not selected:    Yes    I'm not sure   Have you ever had jaundice or liver problems as a result of taking amoxicillin-clavulanate (Augmentin)? Jaundice  is a condition in which the skin and the whites of the eyes turn yellow. Select all that apply.    No, not that I know of   Not selected:    Yes, jaundice    Yes, liver problems   Have you ever had jaundice or liver problems as a result of taking azithromycin (Zithromax, Zmax)? Jaundice is a condition in which the skin and the whites of the eyes turn yellow. Select all that apply.    No, not that I know of   Not selected:    Yes, jaundice    Yes, liver problems   Do you need a doctor's note? A doctor's note confirms that you received care today and states when you can return to school or work. It does not contain information about your diagnosis or treatment plan. Your provider will make the final decision on whether to give you a doctor's note and for how long. Doctor's notes CANNOT be backdated. We can't provide medical leave paperwork through this type of visit. If more paperwork is needed to request time off, contact your primary care provider. Select one.    No   Not selected:    Today only (1 day)    Today and tomorrow (2 days)    3 days    7 days    10 days    14 days   Is there anything else you'd like to tell us about your symptoms?    __I'm allergic to Sulfa (didn't see it on the list). It's my yearly sinus infection but if I don't catch it then it will go into bronchitis. Thick green mucus with congestion. No fever. Slight cough (mostly when I feel stuff going down my throat) and the sore throat. Fluid intake and out put normal.   I am 13 weeks pregnant. Taking daily prenatal.__   ----------   Medical history   Medical history data does not currently exist for this patient.

## 2021-11-27 PROBLEM — Z3A.12 12 WEEKS GESTATION OF PREGNANCY: Status: ACTIVE | Noted: 2021-11-27

## 2021-12-06 ENCOUNTER — ROUTINE PRENATAL (OUTPATIENT)
Dept: OBSTETRICS AND GYNECOLOGY | Facility: CLINIC | Age: 31
End: 2021-12-06

## 2021-12-06 VITALS — WEIGHT: 190 LBS | SYSTOLIC BLOOD PRESSURE: 118 MMHG | DIASTOLIC BLOOD PRESSURE: 60 MMHG | BODY MASS INDEX: 27.26 KG/M2

## 2021-12-06 DIAGNOSIS — Z3A.16 16 WEEKS GESTATION OF PREGNANCY: Primary | ICD-10-CM

## 2021-12-06 LAB
GLUCOSE UR STRIP-MCNC: NEGATIVE MG/DL
PROT UR STRIP-MCNC: NEGATIVE MG/DL

## 2021-12-06 PROCEDURE — 0502F SUBSEQUENT PRENATAL CARE: CPT | Performed by: OBSTETRICS & GYNECOLOGY

## 2021-12-06 NOTE — PROGRESS NOTES
OB FOLLOW UP    Kia Allan is a 31 y.o.  16w4d patient being seen today for her obstetrical follow up visit. Patient reports no complaints. Pt feels well, has questions about herbal tea    Her prenatal care is complicated by (and status) :    Patient Active Problem List   Diagnosis   • Acute non-recurrent maxillary sinusitis   • Allergic reaction   • Prenatal care, subsequent pregnancy, first trimester   • 12 weeks gestation of pregnancy   • 16 weeks gestation of pregnancy       ROS -   Patient Reports : No Problems  Patient Denies: Loss of Fluid, Vaginal Spotting, Vision Changes, Headaches and Cramping/Contractions   Fetal Movement : absent      /60   Wt 86.2 kg (190 lb)   LMP 2021 (Exact Date)   BMI 27.26 kg/m²     Ultrasound Today: no    EXAM:  Vitals: See prenatal flowsheet   Abdomen: See prenatal flowsheet   Urine glucose/protein: See prenatal flowsheet   Pelvic: See prenatal flowsheet     Assessment    Diagnoses and all orders for this visit:    1. 16 weeks gestation of pregnancy (Primary)  -     POC Urinalysis Dipstick  -     US Ob 14 + Weeks Single or First Gestation; Future        1. Pregnancy at 16w4d  2. Fetal status reassuring     Problem List Items Addressed This Visit     16 weeks gestation of pregnancy - Primary    Relevant Orders    POC Urinalysis Dipstick (Completed)    US Ob 14 + Weeks Single or First Gestation          Plan    1. Fetal movement/ Labor Precautions  2. Discussed msAFP; pt declines for now  Diet/exercise precautions  Follow Up: Return in about 4 weeks (around 1/3/2022) for Next scheduled follow up.        Cheryle Kaplan MD  2021

## 2022-01-03 ENCOUNTER — ROUTINE PRENATAL (OUTPATIENT)
Dept: OBSTETRICS AND GYNECOLOGY | Facility: CLINIC | Age: 32
End: 2022-01-03

## 2022-01-03 VITALS — DIASTOLIC BLOOD PRESSURE: 72 MMHG | WEIGHT: 194 LBS | BODY MASS INDEX: 27.84 KG/M2 | SYSTOLIC BLOOD PRESSURE: 118 MMHG

## 2022-01-03 DIAGNOSIS — Z3A.20 20 WEEKS GESTATION OF PREGNANCY: Primary | ICD-10-CM

## 2022-01-03 LAB
GLUCOSE UR STRIP-MCNC: NEGATIVE MG/DL
PROT UR STRIP-MCNC: NEGATIVE MG/DL

## 2022-01-03 PROCEDURE — 0502F SUBSEQUENT PRENATAL CARE: CPT | Performed by: OBSTETRICS & GYNECOLOGY

## 2022-01-03 NOTE — PROGRESS NOTES
OB FOLLOW UP    Kia Allan is a 31 y.o.  20w4d patient being seen today for her obstetrical follow up visit. Patient reports no complaints. Pt feels better, n/v has stopped. Feeling some occ fm    Her prenatal care is complicated by (and status) :    Patient Active Problem List   Diagnosis   • Acute non-recurrent maxillary sinusitis   • Allergic reaction   • Prenatal care, subsequent pregnancy, first trimester   • 12 weeks gestation of pregnancy   • 16 weeks gestation of pregnancy   • 20 weeks gestation of pregnancy       ROS -   Patient Reports : No Problems  Patient Denies: Loss of Fluid, Vaginal Spotting, Vision Changes, Headaches and Cramping/Contractions   Fetal Movement : increased      /72   Wt 88 kg (194 lb)   LMP 2021 (Exact Date)   BMI 27.84 kg/m²     Ultrasound Today: yes    EXAM:  Vitals: See prenatal flowsheet   Abdomen: See prenatal flowsheet   Urine glucose/protein: See prenatal flowsheet   Pelvic: See prenatal flowsheet     Assessment    Diagnoses and all orders for this visit:    1. 20 weeks gestation of pregnancy (Primary)  -     POC Urinalysis Dipstick        1. Pregnancy at 20w4d  2. Fetal status reassuring     Problem List Items Addressed This Visit     20 weeks gestation of pregnancy - Primary    Relevant Orders    POC Urinalysis Dipstick (Completed)          Plan    1. Fetal movement/ Labor Precautions  2. Reviewed nl anatomy scan; declines msAFP  Discussed/encouraged social distancing/COVID19 precautions; encouraged vaccination but patient allergic to PEG.  Follow Up: Return in about 4 weeks (around 2022) for Next scheduled follow up.    Cheryle Kaplan MD  2022

## 2022-01-17 PROCEDURE — U0004 COV-19 TEST NON-CDC HGH THRU: HCPCS | Performed by: FAMILY MEDICINE

## 2022-01-19 ENCOUNTER — TELEPHONE (OUTPATIENT)
Dept: URGENT CARE | Facility: CLINIC | Age: 32
End: 2022-01-19

## 2022-01-19 ENCOUNTER — TELEPHONE (OUTPATIENT)
Dept: OBSTETRICS AND GYNECOLOGY | Facility: CLINIC | Age: 32
End: 2022-01-19

## 2022-01-19 NOTE — TELEPHONE ENCOUNTER
Pt is 22 wks pregnant and has Covid. Pt is concerned about running a fever of 102. Pt would like to speak with a nurse.

## 2022-01-19 NOTE — TELEPHONE ENCOUNTER
01/19/2022 DR MELGAR SPOKE TO PATIENT ABOUT POSITIVE COVID RESULTS    I FAXED PAPER WORK TO HEALTH DEPARTMENT THROUGH MY CHART

## 2022-01-19 NOTE — TELEPHONE ENCOUNTER
Date of positive testin/17  Date of initial symptoms:   Symptoms reported: congestion, coughing, joint pain, fever    May take Zinc 50mg, Vitamin C 500mg, Vitamin D 1000 units, and baby aspirin daily. Purchase a pulse oximeter from [employer]; if consistently <95% (or with signs/symptoms of respiratory distress), go to the ER for evaluation.    [facility] has not changed quarantine guidelines; patients are required to quarantine for 10 days from onset of symptoms.

## 2022-01-31 ENCOUNTER — ROUTINE PRENATAL (OUTPATIENT)
Dept: OBSTETRICS AND GYNECOLOGY | Facility: CLINIC | Age: 32
End: 2022-01-31

## 2022-01-31 VITALS — DIASTOLIC BLOOD PRESSURE: 80 MMHG | BODY MASS INDEX: 28.07 KG/M2 | WEIGHT: 195.6 LBS | SYSTOLIC BLOOD PRESSURE: 120 MMHG

## 2022-01-31 DIAGNOSIS — Z3A.24 24 WEEKS GESTATION OF PREGNANCY: Primary | ICD-10-CM

## 2022-01-31 PROBLEM — Z3A.16 16 WEEKS GESTATION OF PREGNANCY: Status: RESOLVED | Noted: 2021-12-06 | Resolved: 2022-01-31

## 2022-01-31 PROBLEM — Z3A.12 12 WEEKS GESTATION OF PREGNANCY: Status: RESOLVED | Noted: 2021-11-27 | Resolved: 2022-01-31

## 2022-01-31 PROBLEM — Z3A.20 20 WEEKS GESTATION OF PREGNANCY: Status: RESOLVED | Noted: 2022-01-03 | Resolved: 2022-01-31

## 2022-01-31 LAB
GLUCOSE UR STRIP-MCNC: NEGATIVE MG/DL
PROT UR STRIP-MCNC: NEGATIVE MG/DL

## 2022-01-31 PROCEDURE — 0502F SUBSEQUENT PRENATAL CARE: CPT | Performed by: OBSTETRICS & GYNECOLOGY

## 2022-02-15 ENCOUNTER — ROUTINE PRENATAL (OUTPATIENT)
Dept: OBSTETRICS AND GYNECOLOGY | Facility: CLINIC | Age: 32
End: 2022-02-15

## 2022-02-15 ENCOUNTER — TELEPHONE (OUTPATIENT)
Dept: OBSTETRICS AND GYNECOLOGY | Facility: CLINIC | Age: 32
End: 2022-02-15

## 2022-02-15 VITALS — WEIGHT: 202 LBS | BODY MASS INDEX: 28.98 KG/M2 | DIASTOLIC BLOOD PRESSURE: 72 MMHG | SYSTOLIC BLOOD PRESSURE: 112 MMHG

## 2022-02-15 DIAGNOSIS — R55 POSTURAL DIZZINESS WITH PRESYNCOPE: ICD-10-CM

## 2022-02-15 DIAGNOSIS — Z3A.26 26 WEEKS GESTATION OF PREGNANCY: Primary | ICD-10-CM

## 2022-02-15 DIAGNOSIS — R42 POSTURAL DIZZINESS WITH PRESYNCOPE: ICD-10-CM

## 2022-02-15 PROCEDURE — 0502F SUBSEQUENT PRENATAL CARE: CPT | Performed by: OBSTETRICS & GYNECOLOGY

## 2022-02-16 LAB
ERYTHROCYTE [DISTWIDTH] IN BLOOD BY AUTOMATED COUNT: 13 % (ref 12.3–15.4)
HCT VFR BLD AUTO: 32.9 % (ref 34–46.6)
HGB BLD-MCNC: 11.2 G/DL (ref 12–15.9)
MCH RBC QN AUTO: 29.6 PG (ref 26.6–33)
MCHC RBC AUTO-ENTMCNC: 34 G/DL (ref 31.5–35.7)
MCV RBC AUTO: 86.8 FL (ref 79–97)
PLATELET # BLD AUTO: 301 10*3/MM3 (ref 140–450)
RBC # BLD AUTO: 3.79 10*6/MM3 (ref 3.77–5.28)
WBC # BLD AUTO: 10.5 10*3/MM3 (ref 3.4–10.8)

## 2022-02-28 ENCOUNTER — ROUTINE PRENATAL (OUTPATIENT)
Dept: OBSTETRICS AND GYNECOLOGY | Facility: CLINIC | Age: 32
End: 2022-02-28

## 2022-02-28 VITALS — WEIGHT: 201 LBS | SYSTOLIC BLOOD PRESSURE: 120 MMHG | BODY MASS INDEX: 28.84 KG/M2 | DIASTOLIC BLOOD PRESSURE: 70 MMHG

## 2022-02-28 DIAGNOSIS — Z34.82 PRENATAL CARE, SUBSEQUENT PREGNANCY, SECOND TRIMESTER: Primary | ICD-10-CM

## 2022-02-28 LAB
GLUCOSE UR STRIP-MCNC: NEGATIVE MG/DL
PROT UR STRIP-MCNC: NEGATIVE MG/DL

## 2022-02-28 PROCEDURE — 0502F SUBSEQUENT PRENATAL CARE: CPT | Performed by: OBSTETRICS & GYNECOLOGY

## 2022-02-28 PROCEDURE — 90471 IMMUNIZATION ADMIN: CPT | Performed by: OBSTETRICS & GYNECOLOGY

## 2022-02-28 PROCEDURE — 90715 TDAP VACCINE 7 YRS/> IM: CPT | Performed by: OBSTETRICS & GYNECOLOGY

## 2022-02-28 NOTE — PROGRESS NOTES
OB FOLLOW UP    Kia Allan is a 31 y.o.  28w4d patient being seen today for her obstetrical follow up visit. Patient reports no complaints. 28wk labs/packet given A+. TDAP given tdy. Pt feels well, changed her diet lightheadedness getting better. Good fm, no c/o    Her prenatal care is complicated by (and status) :    Patient Active Problem List   Diagnosis   • Acute non-recurrent maxillary sinusitis   • Allergic reaction   • Prenatal care, subsequent pregnancy, first trimester   • 24 weeks gestation of pregnancy   • Prenatal care, subsequent pregnancy, second trimester       ROS -   Patient Reports : No Problems  Patient Denies: Loss of Fluid, Vaginal Spotting, Vision Changes, Headaches and Cramping/Contractions   Fetal Movement : normal      /70   Wt 91.2 kg (201 lb)   LMP 2021 (Exact Date)   BMI 28.84 kg/m²     Ultrasound Today: no    EXAM:  Vitals: See prenatal flowsheet   Abdomen: See prenatal flowsheet   Urine glucose/protein: See prenatal flowsheet   Pelvic: See prenatal flowsheet     Assessment    Diagnoses and all orders for this visit:    1. Prenatal care, subsequent pregnancy, second trimester (Primary)  -     Gestational Screen 1 Hr ([facility])  -     Antibody Screen  -     CBC (No Diff)  -     POC Urinalysis Dipstick    Other orders  -     Tdap Vaccine Greater Than or Equal To 6yo IM        1. Pregnancy at 28w4d  2. Fetal status reassuring     Problem List Items Addressed This Visit     Prenatal care, subsequent pregnancy, second trimester - Primary    Relevant Orders    Gestational Screen 1 Hr ([facility])    Antibody Screen    CBC (No Diff)    POC Urinalysis Dipstick (Completed)          Plan    1. Fetal movement/ Labor Precautions  2. Feeling much better with changing diet/more iron.  Diet/exercise precautions  Follow Up: Return in about 4 weeks (around 3/28/2022) for Next scheduled follow up.        Cheryle Kaplan MD  2022

## 2022-03-01 LAB
BLD GP AB SCN SERPL QL: NEGATIVE
ERYTHROCYTE [DISTWIDTH] IN BLOOD BY AUTOMATED COUNT: 12.9 % (ref 12.3–15.4)
GLUCOSE 1H P 50 G GLC PO SERPL-MCNC: 91 MG/DL (ref 65–139)
HCT VFR BLD AUTO: 33.3 % (ref 34–46.6)
HGB BLD-MCNC: 11.2 G/DL (ref 12–15.9)
MCH RBC QN AUTO: 29.5 PG (ref 26.6–33)
MCHC RBC AUTO-ENTMCNC: 33.6 G/DL (ref 31.5–35.7)
MCV RBC AUTO: 87.6 FL (ref 79–97)
PLATELET # BLD AUTO: 292 10*3/MM3 (ref 140–450)
RBC # BLD AUTO: 3.8 10*6/MM3 (ref 3.77–5.28)
WBC # BLD AUTO: 9.67 10*3/MM3 (ref 3.4–10.8)

## 2022-03-12 PROBLEM — R42 POSTURAL DIZZINESS WITH PRESYNCOPE: Status: ACTIVE | Noted: 2022-03-12

## 2022-03-12 PROBLEM — R55 POSTURAL DIZZINESS WITH PRESYNCOPE: Status: ACTIVE | Noted: 2022-03-12

## 2022-03-12 PROBLEM — Z3A.26 26 WEEKS GESTATION OF PREGNANCY: Status: ACTIVE | Noted: 2022-03-12

## 2022-03-15 ENCOUNTER — TELEPHONE (OUTPATIENT)
Dept: OBSTETRICS AND GYNECOLOGY | Facility: CLINIC | Age: 32
End: 2022-03-15

## 2022-03-15 DIAGNOSIS — K21.9 GASTROESOPHAGEAL REFLUX DISEASE WITHOUT ESOPHAGITIS: Primary | ICD-10-CM

## 2022-03-15 RX ORDER — FAMOTIDINE 20 MG/1
20 TABLET, FILM COATED ORAL
Status: DISCONTINUED | OUTPATIENT
Start: 2022-03-15 | End: 2022-05-18

## 2022-03-15 NOTE — TELEPHONE ENCOUNTER
PT CALLED AND STATED THAT HER ACID REFLUX IS REALLY BAD AND THAT TUMS ARE NOT HELPING, PT ASKED IF WE COULD SEND IN SOMETHING FOR HER TO TAKE

## 2022-03-28 ENCOUNTER — ROUTINE PRENATAL (OUTPATIENT)
Dept: OBSTETRICS AND GYNECOLOGY | Facility: CLINIC | Age: 32
End: 2022-03-28

## 2022-03-28 VITALS — WEIGHT: 205.6 LBS | DIASTOLIC BLOOD PRESSURE: 62 MMHG | BODY MASS INDEX: 29.5 KG/M2 | SYSTOLIC BLOOD PRESSURE: 108 MMHG

## 2022-03-28 DIAGNOSIS — Z3A.32 32 WEEKS GESTATION OF PREGNANCY: Primary | ICD-10-CM

## 2022-03-28 LAB
EXPIRATION DATE: 0
GLUCOSE UR STRIP-MCNC: NEGATIVE MG/DL
Lab: 0
PROT UR STRIP-MCNC: NEGATIVE MG/DL

## 2022-03-28 PROCEDURE — 0502F SUBSEQUENT PRENATAL CARE: CPT | Performed by: OBSTETRICS & GYNECOLOGY

## 2022-04-04 ENCOUNTER — TELEPHONE (OUTPATIENT)
Dept: OBSTETRICS AND GYNECOLOGY | Facility: CLINIC | Age: 32
End: 2022-04-04

## 2022-04-04 NOTE — TELEPHONE ENCOUNTER
Dr. Kpalan OB pt  33w4d    S/w pt she states she is having mild edema in her ankles/feet and has been elevating them for the past two hours and edema is still present. Patient states she has been drinking water today. I advised patient to increase water intake, rest, keep feet elevated and to monitor s/s and if they worsen or do not resolve to CB for further evaluation. She v/u.     Patient denies vaginal bleeding, LOF, HA's, vision changes.

## 2022-04-04 NOTE — TELEPHONE ENCOUNTER
33W OB having feet swelling and legs painful. She said she is having less urine output as well. She has had her feet propped for the last two hours and the swelling has not gone down any.

## 2022-04-11 ENCOUNTER — ROUTINE PRENATAL (OUTPATIENT)
Dept: OBSTETRICS AND GYNECOLOGY | Facility: CLINIC | Age: 32
End: 2022-04-11

## 2022-04-11 VITALS — WEIGHT: 210 LBS | DIASTOLIC BLOOD PRESSURE: 72 MMHG | SYSTOLIC BLOOD PRESSURE: 110 MMHG | BODY MASS INDEX: 30.13 KG/M2

## 2022-04-11 DIAGNOSIS — O26.899 FLANK PAIN IN PREGNANT PATIENT: ICD-10-CM

## 2022-04-11 DIAGNOSIS — R10.9 FLANK PAIN IN PREGNANT PATIENT: ICD-10-CM

## 2022-04-11 DIAGNOSIS — Z3A.34 34 WEEKS GESTATION OF PREGNANCY: Primary | ICD-10-CM

## 2022-04-11 LAB
BILIRUB BLD-MCNC: NEGATIVE MG/DL
CLARITY, POC: CLEAR
COLOR UR: YELLOW
GLUCOSE UR STRIP-MCNC: NEGATIVE MG/DL
GLUCOSE UR STRIP-MCNC: NEGATIVE MG/DL
KETONES UR QL: NEGATIVE
LEUKOCYTE EST, POC: NEGATIVE
NITRITE UR-MCNC: NEGATIVE MG/ML
PH UR: 7 [PH] (ref 5–8)
PROT UR STRIP-MCNC: NEGATIVE MG/DL
PROT UR STRIP-MCNC: NEGATIVE MG/DL
RBC # UR STRIP: NEGATIVE /UL
SP GR UR: 1.02 (ref 1–1.03)
UROBILINOGEN UR QL: NORMAL

## 2022-04-11 PROCEDURE — 0502F SUBSEQUENT PRENATAL CARE: CPT | Performed by: OBSTETRICS & GYNECOLOGY

## 2022-04-12 ENCOUNTER — TELEPHONE (OUTPATIENT)
Dept: OBSTETRICS AND GYNECOLOGY | Facility: CLINIC | Age: 32
End: 2022-04-12

## 2022-04-12 NOTE — TELEPHONE ENCOUNTER
Dr. Kaplan OB pt.  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 Patient requests a note from Dr. Kaplan stating that she is slowing down at work due to pregnancy that way it is covered under HR. I told patient I would discuss this with Dr. Kaplan and if she approves letter I will type it up and it will be available on her mychart. She v/u.

## 2022-04-12 NOTE — TELEPHONE ENCOUNTER
Per Dr. Kaplan : okay for our office to write a letter for pt's work.     Letter will be typed up and signed by Dr. Kaplan and available via portal [employer] for patient.     S/w pt she v/u.

## 2022-04-25 ENCOUNTER — ROUTINE PRENATAL (OUTPATIENT)
Dept: OBSTETRICS AND GYNECOLOGY | Facility: CLINIC | Age: 32
End: 2022-04-25

## 2022-04-25 ENCOUNTER — LAB (OUTPATIENT)
Dept: LAB | Facility: HOSPITAL | Age: 32
End: 2022-04-25

## 2022-04-25 VITALS — SYSTOLIC BLOOD PRESSURE: 122 MMHG | WEIGHT: 212.1 LBS | BODY MASS INDEX: 30.43 KG/M2 | DIASTOLIC BLOOD PRESSURE: 82 MMHG

## 2022-04-25 DIAGNOSIS — Z3A.36 36 WEEKS GESTATION OF PREGNANCY: Primary | ICD-10-CM

## 2022-04-25 DIAGNOSIS — L40.9 PSORIASIS: ICD-10-CM

## 2022-04-25 PROBLEM — Z3A.32 32 WEEKS GESTATION OF PREGNANCY: Status: RESOLVED | Noted: 2022-03-28 | Resolved: 2022-04-25

## 2022-04-25 PROBLEM — Z34.81 PRENATAL CARE, SUBSEQUENT PREGNANCY, FIRST TRIMESTER: Status: RESOLVED | Noted: 2021-09-29 | Resolved: 2022-04-25

## 2022-04-25 PROBLEM — Z34.82 PRENATAL CARE, SUBSEQUENT PREGNANCY, SECOND TRIMESTER: Status: RESOLVED | Noted: 2022-02-28 | Resolved: 2022-04-25

## 2022-04-25 PROBLEM — Z3A.26 26 WEEKS GESTATION OF PREGNANCY: Status: RESOLVED | Noted: 2022-03-12 | Resolved: 2022-04-25

## 2022-04-25 PROBLEM — Z3A.24 24 WEEKS GESTATION OF PREGNANCY: Status: RESOLVED | Noted: 2022-01-31 | Resolved: 2022-04-25

## 2022-04-25 LAB
GLUCOSE UR STRIP-MCNC: NEGATIVE MG/DL
PROT UR STRIP-MCNC: NEGATIVE MG/DL

## 2022-04-25 PROCEDURE — 87081 CULTURE SCREEN ONLY: CPT

## 2022-04-25 PROCEDURE — 0502F SUBSEQUENT PRENATAL CARE: CPT | Performed by: OBSTETRICS & GYNECOLOGY

## 2022-04-25 RX ORDER — EMOLLIENT COMBINATION NO.32
1 EMULSION, EXTENDED RELEASE TOPICAL 2 TIMES DAILY WITH MEALS
Qty: 225 G | Refills: 1 | Status: SHIPPED | OUTPATIENT
Start: 2022-04-25 | End: 2022-04-26 | Stop reason: SDUPTHER

## 2022-04-25 NOTE — PROGRESS NOTES
OB FOLLOW UP  CC- Here for care of pregnancy     Kia Allan is a 31 y.o.  36w4d patient being seen today for her obstetrical follow up visit. Patient reports headache, shoulder pain, and cramping that started over the weekend. She also reports swelling in feet and hands. .     Her prenatal care is complicated by (and status) :    Patient Active Problem List   Diagnosis   • Acute non-recurrent maxillary sinusitis   • Allergic reaction   • Postural dizziness with presyncope   • 36 weeks gestation of pregnancy   • Psoriasis       The additional following portions of the patient's history were reviewed and updated as appropriate: allergies, current medications, past family history, past medical history, past social history, past surgical history and problem list.    ROS -   Patient Reports : Headaches, Shoulder pain, Cramping, and Swelling.  Patient Denies: Loss of Fluid, Vaginal Spotting and Vision Changes  Fetal Movement : normal    I have reviewed and agree with the HPI, ROS, and historical information as entered above. Cheryle Kaplan MD    /82   Wt 96.2 kg (212 lb 1.6 oz)   LMP 2021 (Exact Date)   BMI 30.43 kg/m²     Ultrasound Today: no    EXAM:  Vitals: See prenatal flowsheet   Abdomen: See prenatal flowsheet   Urine glucose/protein: See prenatal flowsheet   HRT: See prenatal flowsheet   Presentation: See prenatal flowsheet   Pelvic: See prenatal flowsheet     GBS Status: Done Today  Her Delivery Plan is: Desires IOL at 39wks. Needs to be scheduled    Assessment and Plan    Problem List Items Addressed This Visit     36 weeks gestation of pregnancy - Primary    Relevant Orders    POC Urinalysis Dipstick (Completed)    Group B Streptococcus Culture - Swab, Vaginal/Rectum    Psoriasis          1. Pregnancy at 36w4d  2. Fetal status reassuring.     Plan:    1. Fetal movement/Labor Precautions  Diet/exercise precautions  Encouraged epsom salts soaks for swelling.  epiceram for psoriasis  on face.  Follow Up: Return in about 1 week (around 5/2/2022) for Next scheduled follow up.      Cheryle Kaplan MD  04/25/2022

## 2022-04-26 ENCOUNTER — TELEPHONE (OUTPATIENT)
Dept: OBSTETRICS AND GYNECOLOGY | Facility: CLINIC | Age: 32
End: 2022-04-26

## 2022-04-26 RX ORDER — EMOLLIENT COMBINATION NO.32
1 EMULSION, EXTENDED RELEASE TOPICAL 2 TIMES DAILY WITH MEALS
Qty: 225 G | Refills: 1 | Status: SHIPPED | OUTPATIENT
Start: 2022-04-26 | End: 2022-05-18

## 2022-04-28 LAB — BACTERIA SPEC AEROBE CULT: NORMAL

## 2022-05-03 ENCOUNTER — ROUTINE PRENATAL (OUTPATIENT)
Dept: OBSTETRICS AND GYNECOLOGY | Facility: CLINIC | Age: 32
End: 2022-05-03

## 2022-05-03 VITALS — SYSTOLIC BLOOD PRESSURE: 118 MMHG | DIASTOLIC BLOOD PRESSURE: 80 MMHG | WEIGHT: 210 LBS | BODY MASS INDEX: 30.13 KG/M2

## 2022-05-03 DIAGNOSIS — Z3A.37 37 WEEKS GESTATION OF PREGNANCY: Primary | ICD-10-CM

## 2022-05-03 LAB
EXPIRATION DATE: NORMAL
GLUCOSE UR STRIP-MCNC: NEGATIVE MG/DL
Lab: NORMAL
PROT UR STRIP-MCNC: NEGATIVE MG/DL

## 2022-05-03 PROCEDURE — 0502F SUBSEQUENT PRENATAL CARE: CPT | Performed by: NURSE PRACTITIONER

## 2022-05-03 NOTE — PROGRESS NOTES
OB FOLLOW UP  CC- Here for care of pregnancy        Kia Allan is a 32 y.o.  37w5d patient being seen today for her obstetrical follow up visit. Patient reports occasional, weather related headaches, swelling in her ankles and hands and occasional beverly butterfield.  She also reports increased cramping, discomfort and pressure.     She denies LOF, vaginal spotting or vision changes.  She reports adequate fetal movements, >10 movements in 10 hours.     Her prenatal care is complicated by (and status) :    Patient Active Problem List   Diagnosis   • Acute non-recurrent maxillary sinusitis   • Allergic reaction   • Postural dizziness with presyncope   • 36 weeks gestation of pregnancy   • Psoriasis     GBS Status: Was already done and it was negative.   Her Delivery Plan is: IOL scheduled for 5/15/2022  Ultrasound Today: No.    ROS -   Patient Reports : headaches, cramping, pressure, swelling, beverly butterfield  Patient Denies: Loss of Fluid, Vaginal Spotting and Vision Changes  Fetal Movement : Adequate  All other systems reviewed and are negative.       The additional following portions of the patient's history were reviewed and updated as appropriate: allergies, current medications, past family history, past medical history, past social history, past surgical history and problem list.    I have reviewed and agree with the HPI, ROS, and historical information as entered above. Erika Parraf, APRN        /80   Wt 95.3 kg (210 lb)   LMP 2021 (Exact Date)   BMI 30.13 kg/m²     EXAM:     FHT: wnl BPM   Uterine Size:  37 cm  Pelvic Exam:  /-    Urine glucose/protein: See prenatal flowsheet       Assessment and Plan    Problem List Items Addressed This Visit    None     Visit Diagnoses     37 weeks gestation of pregnancy    -  Primary    Relevant Orders    POC Protein, Urine, Qualitative, Dipstick (Completed)    POC Glucose, Urine, Qualitative, Dipstick (Completed)          1. Pregnancy at  37w5d  2. Fetal status reassuring.  Rev daily, [facility]MC, labor prec,  3. Activity and Exercise discussed.  Return in about 1 week (around 5/10/2022).    Erika Liu, APRN  05/03/2022

## 2022-05-05 PROBLEM — O26.899 FLANK PAIN IN PREGNANT PATIENT: Status: ACTIVE | Noted: 2022-05-05

## 2022-05-05 PROBLEM — Z3A.34 34 WEEKS GESTATION OF PREGNANCY: Status: ACTIVE | Noted: 2022-05-05

## 2022-05-05 PROBLEM — R10.9 FLANK PAIN IN PREGNANT PATIENT: Status: ACTIVE | Noted: 2022-05-05

## 2022-05-10 ENCOUNTER — ROUTINE PRENATAL (OUTPATIENT)
Dept: OBSTETRICS AND GYNECOLOGY | Facility: CLINIC | Age: 32
End: 2022-05-10

## 2022-05-10 VITALS — SYSTOLIC BLOOD PRESSURE: 122 MMHG | WEIGHT: 213 LBS | DIASTOLIC BLOOD PRESSURE: 80 MMHG | BODY MASS INDEX: 30.56 KG/M2

## 2022-05-10 DIAGNOSIS — Z3A.38 38 WEEKS GESTATION OF PREGNANCY: Primary | ICD-10-CM

## 2022-05-10 DIAGNOSIS — L40.9 PSORIASIS: ICD-10-CM

## 2022-05-10 LAB
GLUCOSE UR STRIP-MCNC: NEGATIVE MG/DL
PROT UR STRIP-MCNC: NEGATIVE MG/DL

## 2022-05-10 PROCEDURE — 0502F SUBSEQUENT PRENATAL CARE: CPT | Performed by: OBSTETRICS & GYNECOLOGY

## 2022-05-10 NOTE — PROGRESS NOTES
OB FOLLOW UP    Kia Allan is a 32 y.o.  38w5d patient being seen today for her obstetrical follow up visit. Patient reports backache, fatigue, headache, heartburn, nausea, occasional contractions and vaginal irritation and increased vaginal discharge. Patient states that this morning she went to the bathroom and her panties were wet and was concerned if it was LOF or just urine.     Her prenatal care is complicated by (and status) :    Patient Active Problem List   Diagnosis   • Acute non-recurrent maxillary sinusitis   • Allergic reaction   • Postural dizziness with presyncope   • 36 weeks gestation of pregnancy   • Psoriasis   • Flank pain in pregnant patient   • 34 weeks gestation of pregnancy   • 38 weeks gestation of pregnancy       ROS -   Patient Reports :  backache, fatigue, headache, heartburn, nausea, occasional contractions and vaginal irritation and increased vaginal discharge.  Patient Denies: Loss of Fluid, Vaginal Spotting and Vision Changes  Fetal Movement : normal      /80   Wt 96.6 kg (213 lb)   LMP 2021 (Exact Date)   BMI 30.56 kg/m²     Ultrasound Today: no    EXAM:  Vitals: See prenatal flowsheet   Abdomen: See prenatal flowsheet   Urine glucose/protein: See prenatal flowsheet   Pelvic: See prenatal flowsheet     Assessment    Diagnoses and all orders for this visit:    1. 38 weeks gestation of pregnancy (Primary)  -     POC Urinalysis Dipstick    2. Psoriasis        1. Pregnancy at 38w5d  2. Fetal status reassuring     Problem List Items Addressed This Visit     Psoriasis    Relevant Medications    Dermatological Products, Misc. (EpiCeram) lotion    38 weeks gestation of pregnancy - Primary    Relevant Orders    POC Urinalysis Dipstick (Completed)          Plan    1. Fetal movement/Labor Precautions  2. Fern, nitrazine neg.   Diet/exercise precautions  Follow Up: Return for scheduled induction 5/15pm.        Cheryle Kaplan MD  05/10/2022

## 2022-05-13 ENCOUNTER — CLINICAL SUPPORT NO REQUIREMENTS (OUTPATIENT)
Dept: PREADMISSION TESTING | Facility: HOSPITAL | Age: 32
End: 2022-05-13

## 2022-05-13 DIAGNOSIS — Z01.818 PRE-OP TESTING: Primary | ICD-10-CM

## 2022-05-13 LAB — SARS-COV-2 RNA PNL SPEC NAA+PROBE: NOT DETECTED

## 2022-05-13 PROCEDURE — C9803 HOPD COVID-19 SPEC COLLECT: HCPCS

## 2022-05-13 PROCEDURE — U0004 COV-19 TEST NON-CDC HGH THRU: HCPCS

## 2022-05-15 ENCOUNTER — HOSPITAL ENCOUNTER (INPATIENT)
Facility: HOSPITAL | Age: 32
LOS: 3 days | Discharge: HOME | End: 2022-05-18
Attending: OBSTETRICS & GYNECOLOGY | Admitting: OBSTETRICS & GYNECOLOGY

## 2022-05-15 ENCOUNTER — HOSPITAL ENCOUNTER (OUTPATIENT)
Dept: LABOR AND DELIVERY | Facility: HOSPITAL | Age: 32
Discharge: HOME | End: 2022-05-15

## 2022-05-15 PROBLEM — Z34.90 ENCOUNTER FOR ELECTIVE INDUCTION OF LABOR: Status: ACTIVE | Noted: 2022-05-15

## 2022-05-15 LAB
ABO GROUP BLD: NORMAL
ABO GROUP BLD: NORMAL
ALP SERPL-CCNC: 190 U/L (ref 39–117)
ALT SERPL W P-5'-P-CCNC: 12 U/L (ref 1–33)
AST SERPL-CCNC: 23 U/L (ref 1–32)
BILIRUB SERPL-MCNC: 0.3 MG/DL (ref 0–1.2)
BLD GP AB SCN SERPL QL: NEGATIVE
CREAT SERPL-MCNC: 0.65 MG/DL (ref 0.57–1)
DEPRECATED RDW RBC AUTO: 42.5 FL (ref 37–54)
ERYTHROCYTE [DISTWIDTH] IN BLOOD BY AUTOMATED COUNT: 13.7 % (ref 12.3–15.4)
HCT VFR BLD AUTO: 34.3 % (ref 34–46.6)
HGB BLD-MCNC: 11.6 G/DL (ref 12–15.9)
LDH SERPL-CCNC: 288 U/L (ref 135–214)
MCH RBC QN AUTO: 28.8 PG (ref 26.6–33)
MCHC RBC AUTO-ENTMCNC: 33.8 G/DL (ref 31.5–35.7)
MCV RBC AUTO: 85.1 FL (ref 79–97)
PLATELET # BLD AUTO: 284 10*3/MM3 (ref 140–450)
PMV BLD AUTO: 10.9 FL (ref 6–12)
RBC # BLD AUTO: 4.03 10*6/MM3 (ref 3.77–5.28)
RH BLD: POSITIVE
RH BLD: POSITIVE
T&S EXPIRATION DATE: NORMAL
URATE SERPL-MCNC: 5.1 MG/DL (ref 2.4–5.7)
WBC NRBC COR # BLD: 10.31 10*3/MM3 (ref 3.4–10.8)

## 2022-05-15 PROCEDURE — 25010000002 ONDANSETRON PER 1 MG: Performed by: OBSTETRICS & GYNECOLOGY

## 2022-05-15 PROCEDURE — 86850 RBC ANTIBODY SCREEN: CPT | Performed by: OBSTETRICS & GYNECOLOGY

## 2022-05-15 PROCEDURE — 85027 COMPLETE CBC AUTOMATED: CPT | Performed by: OBSTETRICS & GYNECOLOGY

## 2022-05-15 PROCEDURE — 82247 BILIRUBIN TOTAL: CPT | Performed by: OBSTETRICS & GYNECOLOGY

## 2022-05-15 PROCEDURE — 86900 BLOOD TYPING SEROLOGIC ABO: CPT | Performed by: OBSTETRICS & GYNECOLOGY

## 2022-05-15 PROCEDURE — 83615 LACTATE (LD) (LDH) ENZYME: CPT | Performed by: OBSTETRICS & GYNECOLOGY

## 2022-05-15 PROCEDURE — 86900 BLOOD TYPING SEROLOGIC ABO: CPT

## 2022-05-15 PROCEDURE — 86901 BLOOD TYPING SEROLOGIC RH(D): CPT

## 2022-05-15 PROCEDURE — 86901 BLOOD TYPING SEROLOGIC RH(D): CPT | Performed by: OBSTETRICS & GYNECOLOGY

## 2022-05-15 PROCEDURE — 84075 ASSAY ALKALINE PHOSPHATASE: CPT | Performed by: OBSTETRICS & GYNECOLOGY

## 2022-05-15 PROCEDURE — 59025 FETAL NON-STRESS TEST: CPT

## 2022-05-15 PROCEDURE — 84550 ASSAY OF BLOOD/URIC ACID: CPT | Performed by: OBSTETRICS & GYNECOLOGY

## 2022-05-15 PROCEDURE — 82565 ASSAY OF CREATININE: CPT | Performed by: OBSTETRICS & GYNECOLOGY

## 2022-05-15 PROCEDURE — 59200 INSERT CERVICAL DILATOR: CPT | Performed by: OBSTETRICS & GYNECOLOGY

## 2022-05-15 PROCEDURE — 84460 ALANINE AMINO (ALT) (SGPT): CPT | Performed by: OBSTETRICS & GYNECOLOGY

## 2022-05-15 PROCEDURE — 84450 TRANSFERASE (AST) (SGOT): CPT | Performed by: OBSTETRICS & GYNECOLOGY

## 2022-05-15 RX ORDER — ONDANSETRON 2 MG/ML
4 INJECTION INTRAMUSCULAR; INTRAVENOUS EVERY 6 HOURS PRN
Status: DISCONTINUED | OUTPATIENT
Start: 2022-05-15 | End: 2022-05-16

## 2022-05-15 RX ORDER — MISOPROSTOL 100 MCG
25 TABLET ORAL ONCE
Status: COMPLETED | OUTPATIENT
Start: 2022-05-15 | End: 2022-05-15

## 2022-05-15 RX ORDER — SODIUM CHLORIDE 0.9 % (FLUSH) 0.9 %
10 SYRINGE (ML) INJECTION EVERY 12 HOURS SCHEDULED
Status: DISCONTINUED | OUTPATIENT
Start: 2022-05-15 | End: 2022-05-16 | Stop reason: HOSPADM

## 2022-05-15 RX ORDER — ERYTHROMYCIN 5 MG/G
OINTMENT OPHTHALMIC
Status: DISCONTINUED
Start: 2022-05-15 | End: 2022-05-18 | Stop reason: HOSPADM

## 2022-05-15 RX ORDER — MAGNESIUM CARB/ALUMINUM HYDROX 105-160MG
30 TABLET,CHEWABLE ORAL ONCE
Status: COMPLETED | OUTPATIENT
Start: 2022-05-15 | End: 2022-05-16

## 2022-05-15 RX ORDER — LIDOCAINE HYDROCHLORIDE 10 MG/ML
5 INJECTION, SOLUTION EPIDURAL; INFILTRATION; INTRACAUDAL; PERINEURAL AS NEEDED
Status: DISCONTINUED | OUTPATIENT
Start: 2022-05-15 | End: 2022-05-16 | Stop reason: HOSPADM

## 2022-05-15 RX ORDER — BUTORPHANOL TARTRATE 1 MG/ML
1 INJECTION, SOLUTION INTRAMUSCULAR; INTRAVENOUS
Status: DISCONTINUED | OUTPATIENT
Start: 2022-05-15 | End: 2022-05-16 | Stop reason: HOSPADM

## 2022-05-15 RX ORDER — SODIUM CHLORIDE 0.9 % (FLUSH) 0.9 %
1-10 SYRINGE (ML) INJECTION AS NEEDED
Status: DISCONTINUED | OUTPATIENT
Start: 2022-05-15 | End: 2022-05-16 | Stop reason: HOSPADM

## 2022-05-15 RX ORDER — OXYTOCIN/0.9 % SODIUM CHLORIDE 30/500 ML
2-24 PLASTIC BAG, INJECTION (ML) INTRAVENOUS
Status: DISCONTINUED | OUTPATIENT
Start: 2022-05-16 | End: 2022-05-16 | Stop reason: HOSPADM

## 2022-05-15 RX ORDER — EPHEDRINE SULFATE 5 MG/ML
INJECTION INTRAVENOUS
Status: DISCONTINUED
Start: 2022-05-15 | End: 2022-05-18 | Stop reason: HOSPADM

## 2022-05-15 RX ORDER — SODIUM CHLORIDE, SODIUM LACTATE, POTASSIUM CHLORIDE, CALCIUM CHLORIDE 600; 310; 30; 20 MG/100ML; MG/100ML; MG/100ML; MG/100ML
125 INJECTION, SOLUTION INTRAVENOUS CONTINUOUS
Status: DISCONTINUED | OUTPATIENT
Start: 2022-05-16 | End: 2022-05-18 | Stop reason: HOSPADM

## 2022-05-15 RX ADMIN — SODIUM CHLORIDE, POTASSIUM CHLORIDE, SODIUM LACTATE AND CALCIUM CHLORIDE 125 ML/HR: 600; 310; 30; 20 INJECTION, SOLUTION INTRAVENOUS at 17:58

## 2022-05-15 RX ADMIN — ONDANSETRON 4 MG: 2 INJECTION INTRAMUSCULAR; INTRAVENOUS at 21:25

## 2022-05-15 RX ADMIN — SODIUM CHLORIDE, POTASSIUM CHLORIDE, SODIUM LACTATE AND CALCIUM CHLORIDE 125 ML/HR: 600; 310; 30; 20 INJECTION, SOLUTION INTRAVENOUS at 21:25

## 2022-05-15 RX ADMIN — MISOPROSTOL 25 MCG: 100 TABLET ORAL at 20:30

## 2022-05-15 NOTE — PROCEDURES
32 y.o.  OB History        4    Para        Term                AB   3    Living           SAB   1    IAB   1    Ectopic   1    Molar        Multiple        Live Births                 Presents at 39 3/7 weeks as an induction of labour due to unfavourable cervix   Her primary OB requests a Marie Bulb placement to initiate the induction of labour.    Fetal Heart Rate Assessment   Method: Fetal HR Assessment Method: external   Beats/min: Fetal HR (beats/min): 150   Baseline: Fetal HR Baseline: normal range   Varibility: Fetal HR Variability: moderate (amplitude range 6 to 25 bpm)   Accels: Fetal HR Accelerations: greater than/equal to 15 bpm, lasting at least 15 seconds   Decels: Fetal HR Decelerations: absent   Tracing Category:       TOCO:  None   SVE: /-2    A Marie Bulb was placed without difficulties with 60 cc of sterile saline.  The patient tolerated the procedure well.

## 2022-05-16 ENCOUNTER — ANESTHESIA EVENT (OUTPATIENT)
Dept: LABOR AND DELIVERY | Facility: HOSPITAL | Age: 32
End: 2022-05-16

## 2022-05-16 ENCOUNTER — ANESTHESIA (OUTPATIENT)
Dept: LABOR AND DELIVERY | Facility: HOSPITAL | Age: 32
End: 2022-05-16

## 2022-05-16 PROCEDURE — 25010000002 FENTANYL CITRATE (PF) 50 MCG/ML SOLUTION: Performed by: NURSE ANESTHETIST, CERTIFIED REGISTERED

## 2022-05-16 PROCEDURE — 25010000002 ROPIVACAINE PER 1 MG: Performed by: NURSE ANESTHETIST, CERTIFIED REGISTERED

## 2022-05-16 PROCEDURE — 0KQM0ZZ REPAIR PERINEUM MUSCLE, OPEN APPROACH: ICD-10-PCS | Performed by: OBSTETRICS & GYNECOLOGY

## 2022-05-16 PROCEDURE — C1755 CATHETER, INTRASPINAL: HCPCS

## 2022-05-16 PROCEDURE — 59400 OBSTETRICAL CARE: CPT | Performed by: OBSTETRICS & GYNECOLOGY

## 2022-05-16 PROCEDURE — C1755 CATHETER, INTRASPINAL: HCPCS | Performed by: ANESTHESIOLOGY

## 2022-05-16 PROCEDURE — 59025 FETAL NON-STRESS TEST: CPT

## 2022-05-16 PROCEDURE — 25010000002 ONDANSETRON PER 1 MG: Performed by: OBSTETRICS & GYNECOLOGY

## 2022-05-16 PROCEDURE — 25010000002 METHYLERGONOVINE MALEATE PER 0.2 MG: Performed by: OBSTETRICS & GYNECOLOGY

## 2022-05-16 PROCEDURE — 51703 INSERT BLADDER CATH COMPLEX: CPT

## 2022-05-16 RX ORDER — SODIUM CHLORIDE 0.9 % (FLUSH) 0.9 %
1-10 SYRINGE (ML) INJECTION AS NEEDED
Status: DISCONTINUED | OUTPATIENT
Start: 2022-05-16 | End: 2022-05-18 | Stop reason: HOSPADM

## 2022-05-16 RX ORDER — OXYCODONE HYDROCHLORIDE AND ACETAMINOPHEN 5; 325 MG/1; MG/1
1 TABLET ORAL EVERY 4 HOURS PRN
Status: DISCONTINUED | OUTPATIENT
Start: 2022-05-16 | End: 2022-05-16 | Stop reason: HOSPADM

## 2022-05-16 RX ORDER — LIDOCAINE HYDROCHLORIDE AND EPINEPHRINE 15; 5 MG/ML; UG/ML
INJECTION, SOLUTION EPIDURAL AS NEEDED
Status: DISCONTINUED | OUTPATIENT
Start: 2022-05-16 | End: 2022-05-16 | Stop reason: SURG

## 2022-05-16 RX ORDER — BISACODYL 10 MG
10 SUPPOSITORY, RECTAL RECTAL DAILY PRN
Status: DISCONTINUED | OUTPATIENT
Start: 2022-05-17 | End: 2022-05-18 | Stop reason: HOSPADM

## 2022-05-16 RX ORDER — IBUPROFEN 600 MG/1
600 TABLET ORAL EVERY 6 HOURS SCHEDULED
Status: DISCONTINUED | OUTPATIENT
Start: 2022-05-17 | End: 2022-05-18 | Stop reason: HOSPADM

## 2022-05-16 RX ORDER — METHYLERGONOVINE MALEATE 0.2 MG/ML
200 INJECTION INTRAVENOUS AS NEEDED
Status: DISCONTINUED | OUTPATIENT
Start: 2022-05-16 | End: 2022-05-16 | Stop reason: HOSPADM

## 2022-05-16 RX ORDER — DOCUSATE SODIUM 100 MG/1
100 CAPSULE, LIQUID FILLED ORAL 2 TIMES DAILY
Status: CANCELLED | OUTPATIENT
Start: 2022-05-16

## 2022-05-16 RX ORDER — FENTANYL CITRATE 50 UG/ML
INJECTION, SOLUTION INTRAMUSCULAR; INTRAVENOUS AS NEEDED
Status: DISCONTINUED | OUTPATIENT
Start: 2022-05-16 | End: 2022-05-16 | Stop reason: SURG

## 2022-05-16 RX ORDER — MISOPROSTOL 200 UG/1
600 TABLET ORAL AS NEEDED
Status: DISCONTINUED | OUTPATIENT
Start: 2022-05-16 | End: 2022-05-18 | Stop reason: HOSPADM

## 2022-05-16 RX ORDER — EPHEDRINE SULFATE 5 MG/ML
10 INJECTION INTRAVENOUS
Status: DISCONTINUED | OUTPATIENT
Start: 2022-05-16 | End: 2022-05-16 | Stop reason: HOSPADM

## 2022-05-16 RX ORDER — MISOPROSTOL 200 UG/1
800 TABLET ORAL AS NEEDED
Status: DISCONTINUED | OUTPATIENT
Start: 2022-05-16 | End: 2022-05-16 | Stop reason: HOSPADM

## 2022-05-16 RX ORDER — FAMOTIDINE 10 MG/ML
20 INJECTION, SOLUTION INTRAVENOUS ONCE AS NEEDED
Status: DISCONTINUED | OUTPATIENT
Start: 2022-05-16 | End: 2022-05-16 | Stop reason: HOSPADM

## 2022-05-16 RX ORDER — PROMETHAZINE HYDROCHLORIDE 12.5 MG/1
12.5 SUPPOSITORY RECTAL EVERY 6 HOURS PRN
Status: DISCONTINUED | OUTPATIENT
Start: 2022-05-16 | End: 2022-05-16 | Stop reason: HOSPADM

## 2022-05-16 RX ORDER — LIDOCAINE HYDROCHLORIDE 10 MG/ML
INJECTION, SOLUTION INFILTRATION; PERINEURAL
Status: DISCONTINUED
Start: 2022-05-16 | End: 2022-05-18 | Stop reason: HOSPADM

## 2022-05-16 RX ORDER — CARBOPROST TROMETHAMINE 250 UG/ML
250 INJECTION, SOLUTION INTRAMUSCULAR AS NEEDED
Status: DISCONTINUED | OUTPATIENT
Start: 2022-05-16 | End: 2022-05-16 | Stop reason: HOSPADM

## 2022-05-16 RX ORDER — ONDANSETRON 2 MG/ML
4 INJECTION INTRAMUSCULAR; INTRAVENOUS EVERY 6 HOURS PRN
Status: DISCONTINUED | OUTPATIENT
Start: 2022-05-16 | End: 2022-05-16 | Stop reason: HOSPADM

## 2022-05-16 RX ORDER — DIPHENHYDRAMINE HYDROCHLORIDE 50 MG/ML
12.5 INJECTION INTRAMUSCULAR; INTRAVENOUS EVERY 8 HOURS PRN
Status: DISCONTINUED | OUTPATIENT
Start: 2022-05-16 | End: 2022-05-16 | Stop reason: HOSPADM

## 2022-05-16 RX ORDER — HYDROCORTISONE 25 MG/G
1 CREAM TOPICAL AS NEEDED
Status: DISCONTINUED | OUTPATIENT
Start: 2022-05-16 | End: 2022-05-18 | Stop reason: HOSPADM

## 2022-05-16 RX ORDER — ROPIVACAINE HYDROCHLORIDE 2 MG/ML
16 INJECTION, SOLUTION EPIDURAL; INFILTRATION; PERINEURAL CONTINUOUS
Status: DISCONTINUED | OUTPATIENT
Start: 2022-05-16 | End: 2022-05-18 | Stop reason: HOSPADM

## 2022-05-16 RX ORDER — MORPHINE SULFATE 2 MG/ML
2 INJECTION, SOLUTION INTRAMUSCULAR; INTRAVENOUS
Status: DISCONTINUED | OUTPATIENT
Start: 2022-05-16 | End: 2022-05-16 | Stop reason: HOSPADM

## 2022-05-16 RX ORDER — IBUPROFEN 600 MG/1
600 TABLET ORAL EVERY 6 HOURS PRN
Status: DISCONTINUED | OUTPATIENT
Start: 2022-05-16 | End: 2022-05-16 | Stop reason: HOSPADM

## 2022-05-16 RX ORDER — ONDANSETRON 4 MG/1
4 TABLET, FILM COATED ORAL EVERY 6 HOURS PRN
Status: DISCONTINUED | OUTPATIENT
Start: 2022-05-16 | End: 2022-05-16 | Stop reason: HOSPADM

## 2022-05-16 RX ORDER — OXYTOCIN/0.9 % SODIUM CHLORIDE 30/500 ML
650 PLASTIC BAG, INJECTION (ML) INTRAVENOUS ONCE
Status: DISCONTINUED | OUTPATIENT
Start: 2022-05-16 | End: 2022-05-16 | Stop reason: HOSPADM

## 2022-05-16 RX ORDER — PROMETHAZINE HYDROCHLORIDE 12.5 MG/1
12.5 TABLET ORAL EVERY 6 HOURS PRN
Status: DISCONTINUED | OUTPATIENT
Start: 2022-05-16 | End: 2022-05-16 | Stop reason: HOSPADM

## 2022-05-16 RX ORDER — OXYTOCIN/0.9 % SODIUM CHLORIDE 30/500 ML
85 PLASTIC BAG, INJECTION (ML) INTRAVENOUS ONCE
Status: COMPLETED | OUTPATIENT
Start: 2022-05-16 | End: 2022-05-16

## 2022-05-16 RX ADMIN — SODIUM CHLORIDE, POTASSIUM CHLORIDE, SODIUM LACTATE AND CALCIUM CHLORIDE 125 ML/HR: 600; 310; 30; 20 INJECTION, SOLUTION INTRAVENOUS at 09:58

## 2022-05-16 RX ADMIN — LIDOCAINE HYDROCHLORIDE AND EPINEPHRINE 1 ML: 15; 5 INJECTION, SOLUTION EPIDURAL at 09:26

## 2022-05-16 RX ADMIN — Medication: at 21:02

## 2022-05-16 RX ADMIN — Medication 2 MILLI-UNITS/MIN: at 00:30

## 2022-05-16 RX ADMIN — MINERAL OIL 30 ML: 1000 SOLUTION ORAL at 16:48

## 2022-05-16 RX ADMIN — Medication 1 APPLICATION: at 21:02

## 2022-05-16 RX ADMIN — WITCH HAZEL 1 PAD: 500 SOLUTION RECTAL; TOPICAL at 21:02

## 2022-05-16 RX ADMIN — FENTANYL CITRATE 100 MCG: 50 INJECTION, SOLUTION INTRAMUSCULAR; INTRAVENOUS at 09:26

## 2022-05-16 RX ADMIN — METHYLERGONOVINE MALEATE 200 MCG: 0.2 INJECTION, SOLUTION INTRAMUSCULAR; INTRAVENOUS at 17:14

## 2022-05-16 RX ADMIN — LIDOCAINE HYDROCHLORIDE AND EPINEPHRINE 3 ML: 15; 5 INJECTION, SOLUTION EPIDURAL at 09:24

## 2022-05-16 RX ADMIN — Medication 85 ML/HR: at 18:26

## 2022-05-16 RX ADMIN — SODIUM CHLORIDE, POTASSIUM CHLORIDE, SODIUM LACTATE AND CALCIUM CHLORIDE 125 ML/HR: 600; 310; 30; 20 INJECTION, SOLUTION INTRAVENOUS at 05:45

## 2022-05-16 RX ADMIN — SODIUM CHLORIDE, POTASSIUM CHLORIDE, SODIUM LACTATE AND CALCIUM CHLORIDE 1000 ML: 600; 310; 30; 20 INJECTION, SOLUTION INTRAVENOUS at 09:17

## 2022-05-16 RX ADMIN — ONDANSETRON 4 MG: 2 INJECTION INTRAMUSCULAR; INTRAVENOUS at 12:15

## 2022-05-16 RX ADMIN — ROPIVACAINE HYDROCHLORIDE 10 ML: 5 INJECTION, SOLUTION EPIDURAL; INFILTRATION; PERINEURAL at 09:28

## 2022-05-16 RX ADMIN — IBUPROFEN 600 MG: 600 TABLET ORAL at 23:55

## 2022-05-16 RX ADMIN — ROPIVACAINE HYDROCHLORIDE 16 ML/HR: 2 INJECTION, SOLUTION EPIDURAL; INFILTRATION at 09:30

## 2022-05-16 RX ADMIN — IBUPROFEN 600 MG: 600 TABLET, FILM COATED ORAL at 17:54

## 2022-05-16 NOTE — L&D DELIVERY NOTE
2022    Patient:Kia Allan    MR#:[id number]    Vaginal Delivery Note  32 y.o. yo female  at 39w4d    Patient Active Problem List   Diagnosis   • Acute non-recurrent maxillary sinusitis   • Allergic reaction   • Postural dizziness with presyncope   • 36 weeks gestation of pregnancy   • Psoriasis   • Flank pain in pregnant patient   • 34 weeks gestation of pregnancy   • 38 weeks gestation of pregnancy   • Encounter for elective induction of labor       Delivery     Delivery:      YOB: 2020    Time of Birth: 5:01 PM      Anesthesia: Epidural     Delivering clinician: Cheryle Kaplan    Forceps?   No   Vacuum? No    Shoulder dystocia present: No          Infant    Findings: male  infant     Infant observations: Weight: 4090 g (9 lb 0.3 oz)     Observations/Comments:        Apgars: 8  @ 1 minute /    9  @ 5 minutes         Placenta, Cord, and Fluid    Placenta delivered  Spontaneous  at  2022  5:12 PM     Cord:   present.   Nuchal Cord?  no     Cord blood obtained:     Cord gases obtained:      Cord gas results: Pending         Repair    Episiotomy: Yes   Median   Lacerations: No   Estimated Blood Loss:  200 mls.         Complications  Mild uterine atony    Disposition  Mother to Mother Baby/Postpartum  in stable condition currently.  Baby to remains with mom  in stable condition currently.    Description of Delivery  Patient pushed well x about an hour and delivered easily from OA presentation.  Shoulders delivered easily. Nose and mouth was bulb suctioned and baby was placed on mother's abdomen.  Cord was milked,  clamped and cut.  2nd degree Repair was done next.  Placenta delivered easily and with pitocin bolus, there was no uterine atony.       Cheryle Kaplan MD  22  18:14 EDT
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
## 2022-05-16 NOTE — ANESTHESIA PROCEDURE NOTES
Labor Epidural      Patient reassessed immediately prior to procedure    Patient location during procedure: OB  Performed By  CRNA/CAA: Chiqui Walters CRNA  Preanesthetic Checklist  Completed: patient identified, IV checked, risks and benefits discussed, surgical consent, monitors and equipment checked, pre-op evaluation and timeout performed  Prep:  Pt Position:sitting  Sterile Tech:cap, gloves, mask and sterile barrier  Prep:DuraPrep  Monitoring:blood pressure monitoring  Epidural Block Procedure:  Approach:midline  Guidance:palpation technique  Location:L3-L4  Needle Type:Tuohy  Needle Gauge:17 G  Loss of Resistance Medium: saline  Loss of Resistance: 6cm  Cath Depth at skin:12 cm  Aspiration:negative  Test Dose:negative  Number of Attempts: 1  Post Assessment:  Dressing:occlusive dressing applied and secured with tape  Pt Tolerance:patient tolerated the procedure well with no apparent complications  Complications:no

## 2022-05-16 NOTE — ANESTHESIA PREPROCEDURE EVALUATION
Anesthesia Evaluation     Patient summary reviewed and Nursing notes reviewed   NPO Solid Status: > 6 hours  NPO Liquid Status: > 6 hours           Airway   Dental      Pulmonary    (+) asthma,  Cardiovascular - negative cardio ROS        Neuro/Psych  (+) headaches, dizziness/light headedness, psychiatric history Anxiety and Depression,    GI/Hepatic/Renal/Endo    (+)  GERD,      Musculoskeletal     Abdominal    Substance History - negative use     OB/GYN    (+) Pregnant,         Other   arthritis,                      Anesthesia Plan    ASA 2     epidural       Anesthetic plan, all risks, benefits, and alternatives have been provided, discussed and informed consent has been obtained with: patient.        CODE STATUS:    Level Of Support Discussed With: Patient  Code Status (Patient has no pulse and is not breathing): CPR (Attempt to Resuscitate)  Medical Interventions (Patient has pulse or is breathing): Full Support

## 2022-05-17 LAB
BASOPHILS # BLD AUTO: 0.04 10*3/MM3 (ref 0–0.2)
BASOPHILS NFR BLD AUTO: 0.2 % (ref 0–1.5)
DEPRECATED RDW RBC AUTO: 42.7 FL (ref 37–54)
EOSINOPHIL # BLD AUTO: 0.04 10*3/MM3 (ref 0–0.4)
EOSINOPHIL NFR BLD AUTO: 0.2 % (ref 0.3–6.2)
ERYTHROCYTE [DISTWIDTH] IN BLOOD BY AUTOMATED COUNT: 13.8 % (ref 12.3–15.4)
HCT VFR BLD AUTO: 25.8 % (ref 34–46.6)
HGB BLD-MCNC: 9.1 G/DL (ref 12–15.9)
IMM GRANULOCYTES # BLD AUTO: 0.19 10*3/MM3 (ref 0–0.05)
IMM GRANULOCYTES NFR BLD AUTO: 1.1 % (ref 0–0.5)
LYMPHOCYTES # BLD AUTO: 1.55 10*3/MM3 (ref 0.7–3.1)
LYMPHOCYTES NFR BLD AUTO: 8.6 % (ref 19.6–45.3)
MCH RBC QN AUTO: 30 PG (ref 26.6–33)
MCHC RBC AUTO-ENTMCNC: 35.3 G/DL (ref 31.5–35.7)
MCV RBC AUTO: 85.1 FL (ref 79–97)
MONOCYTES # BLD AUTO: 1.43 10*3/MM3 (ref 0.1–0.9)
MONOCYTES NFR BLD AUTO: 8 % (ref 5–12)
NEUTROPHILS NFR BLD AUTO: 14.68 10*3/MM3 (ref 1.7–7)
NEUTROPHILS NFR BLD AUTO: 81.9 % (ref 42.7–76)
NRBC BLD AUTO-RTO: 0 /100 WBC (ref 0–0.2)
PLATELET # BLD AUTO: 192 10*3/MM3 (ref 140–450)
PMV BLD AUTO: 11 FL (ref 6–12)
RBC # BLD AUTO: 3.03 10*6/MM3 (ref 3.77–5.28)
WBC NRBC COR # BLD: 17.93 10*3/MM3 (ref 3.4–10.8)

## 2022-05-17 PROCEDURE — 0503F POSTPARTUM CARE VISIT: CPT | Performed by: NURSE PRACTITIONER

## 2022-05-17 PROCEDURE — 85025 COMPLETE CBC W/AUTO DIFF WBC: CPT | Performed by: OBSTETRICS & GYNECOLOGY

## 2022-05-17 RX ADMIN — IBUPROFEN 600 MG: 600 TABLET ORAL at 19:15

## 2022-05-17 RX ADMIN — IBUPROFEN 600 MG: 600 TABLET ORAL at 23:52

## 2022-05-17 RX ADMIN — IBUPROFEN 600 MG: 600 TABLET ORAL at 05:48

## 2022-05-17 RX ADMIN — IBUPROFEN 600 MG: 600 TABLET ORAL at 13:42

## 2022-05-17 NOTE — PLAN OF CARE
Goal Outcome Evaluation:           Problem: Breastfeeding  Goal: Effective Breastfeeding  Outcome: Ongoing, Progressing  Intervention: Support Exclusive Breastfeeding Success  Flowsheets (Taken 5/17/2022 0938)  Supportive Measures:   active listening utilized   counseling provided  Breastfeeding Support:   lactation counseling provided   encouragement provided  Intervention: Promote Effective Breastfeeding  Flowsheets (Taken 5/17/2022 0938)  Breastfeeding Assistance: assisted with positioning  Parent/Child Attachment Promotion:   caring behavior modeled   cue recognition promoted   positive reinforcement provided   strengths emphasized   skin-to-skin contact encouraged

## 2022-05-17 NOTE — PROGRESS NOTES
5/17/2022  PPD #1    Subjective   Kia feels well.  Patient describes her lochia less than menses.  Pain is well controlled       Objective   Temp: Temp:  [97.9 °F (36.6 °C)-100.5 °F (38.1 °C)] 97.9 °F (36.6 °C) Temp src: Oral   BP: BP: ()/(55-76) 107/60        Pulse: Heart Rate:  [] 108  RR: Resp:  [16-20] 20    General:  No acute distress   Abdomen: Fundus firm and beneath umbilicus   Pelvis: deferred     Lab Results   Component Value Date    WBC 17.93 (H) 05/17/2022    HGB 9.1 (L) 05/17/2022    HCT 25.8 (L) 05/17/2022    MCV 85.1 05/17/2022     05/17/2022    HEPBSAG Negative 09/30/2021       Assessment  1. PPD# 1 after vaginal delivery   2. Infant Male desires circumcision    Plan  1. Routine postpartum care.      This note has been electronically signed.    Diane Hairston, APRN  May 17, 2022

## 2022-05-17 NOTE — LACTATION NOTE
Follow-up visit at RN request to witness latch; great latch and positioning noted with infant in right football; encouraged to call lactation if need further assistance

## 2022-05-17 NOTE — LACTATION NOTE
05/17/22 0938   Maternal Information   Date of Referral 05/17/22   Person Making Referral   (courtesy visit)   Maternal Reason for Referral   (first time mother)   Infant Reason for Referral   (new delivery)   Maternal Assessment   Breast Size Issue none   Breast Shape Bilateral:;round   Nipples Bilateral:;everted   Left Nipple Symptoms intact   Right Nipple Symptoms intact   Maternal Infant Feeding   Maternal Emotional State receptive;relaxed   Infant Positioning clutch/football  (right football)   Latch Assistance minimal assistance   Support Person Involvement actively supporting mother   Milk Expression/Equipment   Breast Pump Type double electric, personal   Equipment for Home Use pump not needed at this time  (has Medela pump at bedside)   First-time mother; assisted with right football hold; infant too sleepy; placed infant skin to skin; went over teaching and positioning; has Medela pump at bedside; encouraged to call lactation for next nursing session or had questions/concerns.

## 2022-05-17 NOTE — ANESTHESIA POSTPROCEDURE EVALUATION
Patient: Kia Allan    Procedure Summary     Date: 05/16/22 Room / Location:     Anesthesia Start: 0912 Anesthesia Stop: 1712    Procedure: LABOR ANALGESIA Diagnosis:     Scheduled Providers:  Provider: Norberto Ruiz DO    Anesthesia Type: epidural ASA Status: 2          Anesthesia Type: epidural    Vitals  Vitals Value Taken Time   /60 05/17/22 0800   Temp 97.9 °F (36.6 °C) 05/17/22 0800   Pulse 108 05/17/22 0800   Resp 20 05/17/22 0800   SpO2             Post Anesthesia Care and Evaluation    Patient location during evaluation: bedside  Patient participation: complete - patient participated  Level of consciousness: awake and alert  Pain management: adequate  Airway patency: patent  Anesthetic complications: No anesthetic complications    Cardiovascular status: acceptable  Respiratory status: acceptable  Hydration status: acceptable  Post Neuraxial Block status: Motor and sensory function returned to baseline and No signs or symptoms of PDPH

## 2022-05-18 VITALS
DIASTOLIC BLOOD PRESSURE: 54 MMHG | TEMPERATURE: 97.6 F | BODY MASS INDEX: 30.56 KG/M2 | HEART RATE: 92 BPM | SYSTOLIC BLOOD PRESSURE: 99 MMHG | RESPIRATION RATE: 18 BRPM | HEIGHT: 70 IN

## 2022-05-18 PROCEDURE — 0503F POSTPARTUM CARE VISIT: CPT

## 2022-05-18 RX ORDER — IBUPROFEN 600 MG/1
600 TABLET ORAL EVERY 6 HOURS SCHEDULED
Qty: 30 TABLET | Refills: 0 | Status: SHIPPED | OUTPATIENT
Start: 2022-05-18 | End: 2022-07-18

## 2022-05-18 RX ADMIN — IBUPROFEN 600 MG: 600 TABLET ORAL at 08:33

## 2022-05-18 RX ADMIN — Medication 1 APPLICATION: at 14:11

## 2022-05-18 RX ADMIN — IBUPROFEN 600 MG: 600 TABLET ORAL at 14:11

## 2022-05-18 NOTE — PLAN OF CARE
Problem: Adult Inpatient Plan of Care  Goal: Plan of Care Review  Outcome: Met  Goal: Patient-Specific Goal (Individualized)  Outcome: Met  Goal: Absence of Hospital-Acquired Illness or Injury  Outcome: Met  Intervention: Identify and Manage Fall Risk  Recent Flowsheet Documentation  Taken 5/18/2022 1000 by Melissa Raymond RN  Safety Promotion/Fall Prevention: safety round/check completed  Taken 5/18/2022 0900 by Melissa Raymond RN  Safety Promotion/Fall Prevention: safety round/check completed  Taken 5/18/2022 0833 by Melissa Raymond RN  Safety Promotion/Fall Prevention: safety round/check completed  Intervention: Prevent Skin Injury  Recent Flowsheet Documentation  Taken 5/18/2022 0900 by Melissa Raymond RN  Body Position: sitting up in bed  Intervention: Prevent and Manage VTE (Venous Thromboembolism) Risk  Recent Flowsheet Documentation  Taken 5/18/2022 1000 by Melissa Raymond RN  Activity Management: up ad arelis  Goal: Optimal Comfort and Wellbeing  Outcome: Met  Intervention: Monitor Pain and Promote Comfort  Recent Flowsheet Documentation  Taken 5/18/2022 0833 by Melissa Raymond RN  Pain Management Interventions: see MAR  Goal: Readiness for Transition of Care  Outcome: Met   Goal Outcome Evaluation:

## 2022-05-18 NOTE — DISCHARGE SUMMARY
Discharge Summary    Date of Admission: 5/15/2022  Date of Discharge:  2022      Patient: Kia Allan      MR#:[id number]    Delivery Provider: Cheryle Kaplan     Discharge Surgeon/OB: Eusebio    Presenting Problem/History of Present Illness  Encounter for elective induction of labor [Z34.90]  Encounter for elective induction of labor [Z34.90]     Patient Active Problem List   Diagnosis   • Acute non-recurrent maxillary sinusitis   • Allergic reaction   • Postural dizziness with presyncope   • 36 weeks gestation of pregnancy   • Psoriasis   • Flank pain in pregnant patient   • 34 weeks gestation of pregnancy   • 38 weeks gestation of pregnancy   • Encounter for elective induction of labor         Discharge Diagnosis: Vaginal delivery at 39w4d    Procedures:  Vaginal, Spontaneous     2022    5:01 PM        Discharge Date: 2022;     Hospital Course  Patient is a 32 y.o. female  at 39w4d status post vaginal delivery without complication. Postpartum the patient did well. She remained afebrile, with vital signs stable. She was ready for discharge on postpartum day 2.     Infant:   male  fetus 4090 g (9 lb 0.3 oz)  with Apgar scores of 8 , 9  at five minutes.    Condition on Discharge:  Stable    Vital Signs  Temp:  [97.6 °F (36.4 °C)-98.1 °F (36.7 °C)] 97.6 °F (36.4 °C)  Heart Rate:  [81-92] 92  Resp:  [16-20] 18  BP: ()/(50-61) 99/54    Lab Results   Component Value Date    WBC 17.93 (H) 2022    HGB 9.1 (L) 2022    HCT 25.8 (L) 2022    MCV 85.1 2022     2022       Discharge Disposition  Home or Self Care    Discharge Medications     Discharge Medications      New Medications      Instructions Start Date   ibuprofen 600 MG tablet  Commonly known as: ADVIL,MOTRIN   600 mg, Oral, Every 6 Hours Scheduled         Continue These Medications      Instructions Start Date   albuterol sulfate  (90 Base) MCG/ACT inhaler  Commonly known as:  PROVENTIL HFA;VENTOLIN HFA;PROAIR HFA   2 puffs, Inhalation, Every 4 Hours PRN      EPINEPHrine 0.3 MG/0.3ML solution auto-injector injection  Commonly known as: EPIPEN   EpiPen 2-Mauricio 0.3 MG/0.3ML Injection Solution Auto-injector; Patient Sig: EpiPen 2-Mauricio 0.3 MG/0.3ML Injection Solution Auto-injector Use as directed for severe allergic reaction associated with difficulty breathing; 2; 2; 22-Feb-2016; Active      prenatal vitamin 27-0.8 27-0.8 MG tablet tablet   Oral, Daily         Stop These Medications    EpiCeram lotion     ferrous sulfate 325 (65 FE) MG tablet     loratadine 10 MG tablet  Commonly known as: CLARITIN     vitamin C 250 MG tablet  Commonly known as: ASCORBIC ACID     Vitamin D (Cholecalciferol) 10 MCG (400 UNIT) tablet  Commonly known as: CHOLECALCIFEROL     ZINC 15 PO        ASK your doctor about these medications      Instructions Start Date   Progesterone 100 MG capsule  Commonly known as: Prometrium   100 mg, Oral, Daily             Activity at Discharge:   Activity Instructions     Pelvic Rest            Follow-up Appointments  No future appointments.  Additional Instructions for the Follow-ups that You Need to Schedule     Call MD With Problems / Concerns   As directed      Discharge Follow-up with Specified Provider: Eusebio; 6 Weeks   As directed      To: Eusebio    Follow Up: 6 Weeks             Infant: Male, doing well; desires circ prior to d/c  BAY Flores  05/18/22  11:17 EDT  Csd

## 2022-06-16 ENCOUNTER — TELEPHONE (OUTPATIENT)
Dept: OBSTETRICS AND GYNECOLOGY | Facility: CLINIC | Age: 32
End: 2022-06-16

## 2022-06-16 NOTE — TELEPHONE ENCOUNTER
Pt thinks she has the start of PPD and would like to speak to someone. She has a 6W PP FU on 6/28 but thinks she needs to come in sooner.     Please advise

## 2022-06-16 NOTE — TELEPHONE ENCOUNTER
Pt states she is crying frequently, struggling to get anything done, and having anxiety. Denies SI/HI. She has a hx of anxiety and depression but has never taken any medication. We will have her come in tomorrow to be seen by myself.

## 2022-06-17 ENCOUNTER — POSTPARTUM VISIT (OUTPATIENT)
Dept: OBSTETRICS AND GYNECOLOGY | Facility: CLINIC | Age: 32
End: 2022-06-17

## 2022-06-17 VITALS
WEIGHT: 184 LBS | DIASTOLIC BLOOD PRESSURE: 82 MMHG | HEIGHT: 70 IN | BODY MASS INDEX: 26.34 KG/M2 | SYSTOLIC BLOOD PRESSURE: 110 MMHG

## 2022-06-17 PROCEDURE — 0503F POSTPARTUM CARE VISIT: CPT | Performed by: NURSE PRACTITIONER

## 2022-06-17 NOTE — PROGRESS NOTES
"Chief Complaint   Patient presents with   • Postpartum Care     4 wk visit       4 Week Postpartum Visit         Kia Allan is a 32 y.o.  s/p  at 39 weeks 4 days on 2022, who presents today for a 4 week postpartum check.      At the time of delivery were you diagnosed with any of the following: None. The episiotomy is healing well.    Patient reports postpartum depression. She describes frequent crying, feeling panicky about leaving baby or \"something bad happening to baby\". She does not want anyone else to hold baby and she sits on the couch all day worrying about him. She denies SI or HI. She is having difficulty sleeping. She reported some family stress with the paternal GM. FOB is involved and helpful. She is trying to get out of the house at least once a day for a walk or to sit on the porch.    Patient describes bleeding as light. Patient stated that her belling is more like spotting. Patient is breast feeding.  Desires contraceptive methods: None for contraception.  Patient denies bowel or bladder issues.    Postpartum Depression Screening Questionnaire: 16,  treatment indicated.  Baby Name: Donaldo  Baby Weight: 9 pounds 0.3 ounces  Baby Discharged: Discharged with Mom  Delivering Physician: Eusebio    Last Completed Pap Smear          PAP SMEAR (Every 3 Years) Next due on 2021  SCANNED - PAP SMEAR    2018  Done - Negative, GC/Chl Negative, Yeast +              Is the patient due for a pap today? No    The additional following portions of the patient's history were reviewed and updated as appropriate: allergies and current medications.    Review of Systems   Constitutional: Negative.    Psychiatric/Behavioral: Positive for sleep disturbance, depressed mood and stress. Negative for hallucinations, self-injury and suicidal ideas. The patient is nervous/anxious.      All other systems reviewed and are negative.     I have reviewed and agree with the HPI, ROS, and " "historical information as entered above. Diane Haenship, APRN    /82   Ht 177.8 cm (70\")   Wt 83.5 kg (184 lb)   LMP 03/20/2021 (Exact Date)   Breastfeeding Yes   BMI 26.40 kg/m²     Physical Exam  Vitals and nursing note reviewed.   Constitutional:       Appearance: Normal appearance.   Neurological:      Mental Status: She is alert and oriented to person, place, and time.   Psychiatric:         Attention and Perception: Attention and perception normal. She does not perceive auditory or visual hallucinations.         Mood and Affect: Mood is anxious and depressed. Affect is tearful.         Speech: Speech normal.         Behavior: Behavior normal. Behavior is cooperative.         Thought Content: Thought content normal. Thought content is not paranoid or delusional. Thought content does not include homicidal or suicidal ideation. Thought content does not include homicidal or suicidal plan.         Cognition and Memory: Cognition and memory normal.         Judgment: Judgment normal.             Assessment and Plan    Problem List Items Addressed This Visit    None     Visit Diagnoses     Postpartum depression    -  Primary    Encounter for postpartum visit              1. S/p Vaginal delivery, 6 weeks postpartum.  Doing well.    2. Continue light physical activity.  Continue pelvic restrictions.   3. Contraception: contraceptive methods: Abstinence until 6 weeks PP  4. Pt scored a 16 on the PP depression screening. She denies SI or HI.   5. Pt will start Zoloft 50mg PO QD. She will monitor for worsening symptoms or SI and call immediately if she experiences these things.  6. Behavioral therapy recommendation sheets given and encouraged pt to call.  7. FU for 6 week PP apt and prn    Diane Haenship, APRN  06/17/2022  "

## 2022-06-20 ENCOUNTER — TELEPHONE (OUTPATIENT)
Dept: OBSTETRICS AND GYNECOLOGY | Facility: CLINIC | Age: 32
End: 2022-06-20

## 2022-06-20 NOTE — TELEPHONE ENCOUNTER
S/w pt she states she has not received the RX that was suppose to be called into her pharmacy last Friday.     Per last OV note from [place]: zoloft 50mg 1 tablet daily.     Medication has been sent to pharmacy, pt. Is aware.

## 2022-06-20 NOTE — TELEPHONE ENCOUNTER
PT CALLED AND STATED SHE WAS SEEN ON Friday AND THAT SOME MEDS WERE SUPPOSED TO HAVE BEEN CALLED INTO HER PHARMACY FOR HER PPD   PT STATED THAT HER PHARMACY STILL HAS NOT RECEIVED THE RX

## 2022-06-22 ENCOUNTER — POSTPARTUM VISIT (OUTPATIENT)
Dept: OBSTETRICS AND GYNECOLOGY | Facility: CLINIC | Age: 32
End: 2022-06-22

## 2022-06-22 ENCOUNTER — TELEPHONE (OUTPATIENT)
Dept: OBSTETRICS AND GYNECOLOGY | Facility: CLINIC | Age: 32
End: 2022-06-22

## 2022-06-22 VITALS
DIASTOLIC BLOOD PRESSURE: 70 MMHG | HEIGHT: 70 IN | BODY MASS INDEX: 26.28 KG/M2 | WEIGHT: 183.6 LBS | SYSTOLIC BLOOD PRESSURE: 118 MMHG

## 2022-06-22 DIAGNOSIS — R60.9 SWELLING: Primary | ICD-10-CM

## 2022-06-22 PROCEDURE — 0503F POSTPARTUM CARE VISIT: CPT

## 2022-06-22 NOTE — PROGRESS NOTES
Chief Complaint   Patient presents with   • Postpartum Care     Face Swelling       Postpartum facial swelling       Kia Allan is a 32 y.o.  s/p Vaginal delivery at 39/4 weeks on 2022, who presents today for facial swelling.    The patient was diagnosed with none. The patient complains of facial swelling that started 22 and headache the past 5 days. Not resolved w IBU. The patient denies mid/right upper quadrant pain and vision changes, SOA,HTN, uti related symptoms, throat swelling Patient stated that she has not started on her Zoloft yet and has not changed any of her medications, detergents, new foods or soaps.Patient stated that she is only peeing 3-4 times a day and is drinking 120 oz of water a day.     Patient reports postpartum depression.  Patient describes bleeding as absent.  Patient is breast feeding.  Desires contraceptive methods: considering IUD for contraception.  Denies bowel or bladder issues.    Postpartum Depression Screening Questionnaire: 14, yes treatment indicated. Denies thoughts/plans of self harm or harm to others.  Baby Name: Donaldo    The additional following portions of the patient's history were reviewed and updated as appropriate: allergies, current medications, past family history, past medical history, past social history, past surgical history and problem list.      Review of Systems   Constitutional: Negative.    HENT: Positive for facial swelling. Negative for trouble swallowing and voice change.    Eyes: Negative.    Respiratory: Negative.  Negative for chest tightness, shortness of breath and wheezing.    Cardiovascular: Negative.  Negative for chest pain and palpitations.   Gastrointestinal: Negative.  Negative for abdominal distention and abdominal pain.   Endocrine: Negative.    Genitourinary: Positive for decreased urine volume (Peeing 4x/day). Negative for urinary incontinence and vaginal bleeding.   Skin:        History of Psoriasis.  "Facial swelling, redness.   Allergic/Immunologic: Positive for environmental allergies and food allergies.   Neurological: Positive for headache.   Hematological: Negative.    Psychiatric/Behavioral: Negative.  Negative for suicidal ideas and depressed mood. The patient is not nervous/anxious.    All other systems reviewed and are negative.    All other systems reviewed and are negative.     I have reviewed and agree with the HPI, ROS, and historical information as entered above. Lillian Zuñiga, APRN    Objective   /70   Ht 177.8 cm (70\")   Wt 83.3 kg (183 lb 9.6 oz)   BMI 26.34 kg/m²     Physical Exam  Vitals and nursing note reviewed.   HENT:      Head: Atraumatic.   Cardiovascular:      Rate and Rhythm: Normal rate.      Pulses: Normal pulses.      Heart sounds: Normal heart sounds.   Pulmonary:      Effort: Pulmonary effort is normal. No respiratory distress.      Breath sounds: Normal breath sounds. No stridor. No wheezing, rhonchi or rales.   Chest:      Chest wall: No tenderness.   Abdominal:      General: There is no distension.   Skin:     Findings: Erythema (Facial) present.      Comments: Facial swelling, redness, flaking   Neurological:      Mental Status: She is alert and oriented to person, place, and time.   Psychiatric:         Mood and Affect: Mood normal.         Behavior: Behavior normal.          Assessment and Plan    Problem List Items Addressed This Visit    None     Visit Diagnoses     Swelling    -  Primary    Relevant Orders    AlP+ALT+AST+Creat+LD+TBili+..    CBC & Differential    Urinalysis With Culture If Indicated - Urine, Clean Catch    Postpartum follow-up        Relevant Orders    AlP+ALT+AST+Creat+LD+TBili+..    CBC & Differential    Urinalysis With Culture If Indicated - Urine, Clean Catch          1. S/p Vaginal delivery, 2 weeks postpartum.  Doing well other than recent swelling.    2. Continued pelvic rest.   3. Contraception: contraceptive methods: Abstinence "   4. Patient instructed not to start Zoloft until swelling has resolved.   5. Plans to begin therapy within the next week  6. Labs & UA ordered  7. Tylenol PRN HA  8. Call for persistent HA, vision changes, epigastric pain, BP >140/90, SOA, persistent/worsening swelling  9. Go to nearest ED for treatment if throat swelling or other emergent swelling begins. Pt VU and has Epipen.   10. Take Benadryl & Pepcid PRN.  10. Consulted with AYUSH   11. Checking IUD benefits.   Return for Next scheduled follow up with AYUSH 6/28/22.    Lillian Zuñiga, APRN  06/22/2022

## 2022-06-22 NOTE — TELEPHONE ENCOUNTER
Patient states that she has had headaches for past few days which is usual for her, but her face is swelling, her significant other thinks her left foot is swollen, she is 5 weeks pp

## 2022-06-23 LAB
ALP SERPL-CCNC: 120 U/L (ref 39–117)
ALT SERPL-CCNC: 32 U/L (ref 1–33)
AST SERPL-CCNC: 25 U/L (ref 1–32)
BASOPHILS # BLD AUTO: 0.03 10*3/MM3 (ref 0–0.2)
BASOPHILS NFR BLD AUTO: 0.6 % (ref 0–1.5)
BILIRUB SERPL-MCNC: 0.4 MG/DL (ref 0–1.2)
CREAT SERPL-MCNC: 0.9 MG/DL (ref 0.57–1)
EGFRCR SERPLBLD CKD-EPI 2021: 88.4 ML/MIN/1.73
EOSINOPHIL # BLD AUTO: 0.4 10*3/MM3 (ref 0–0.4)
EOSINOPHIL NFR BLD AUTO: 7.6 % (ref 0.3–6.2)
ERYTHROCYTE [DISTWIDTH] IN BLOOD BY AUTOMATED COUNT: 12.4 % (ref 12.3–15.4)
HCT VFR BLD AUTO: 39.7 % (ref 34–46.6)
HGB BLD-MCNC: 13 G/DL (ref 12–15.9)
IMM GRANULOCYTES # BLD AUTO: 0.02 10*3/MM3 (ref 0–0.05)
IMM GRANULOCYTES NFR BLD AUTO: 0.4 % (ref 0–0.5)
LDH SERPL-CCNC: 158 U/L (ref 135–214)
LYMPHOCYTES # BLD AUTO: 1.39 10*3/MM3 (ref 0.7–3.1)
LYMPHOCYTES NFR BLD AUTO: 26.3 % (ref 19.6–45.3)
MCH RBC QN AUTO: 28.2 PG (ref 26.6–33)
MCHC RBC AUTO-ENTMCNC: 32.7 G/DL (ref 31.5–35.7)
MCV RBC AUTO: 86.1 FL (ref 79–97)
MONOCYTES # BLD AUTO: 0.53 10*3/MM3 (ref 0.1–0.9)
MONOCYTES NFR BLD AUTO: 10 % (ref 5–12)
NEUTROPHILS # BLD AUTO: 2.91 10*3/MM3 (ref 1.7–7)
NEUTROPHILS NFR BLD AUTO: 55.1 % (ref 42.7–76)
NRBC BLD AUTO-RTO: 0 /100 WBC (ref 0–0.2)
PLATELET # BLD AUTO: 316 10*3/MM3 (ref 140–450)
RBC # BLD AUTO: 4.61 10*6/MM3 (ref 3.77–5.28)
URATE SERPL-MCNC: 6 MG/DL (ref 2.4–5.7)
WBC # BLD AUTO: 5.28 10*3/MM3 (ref 3.4–10.8)

## 2022-06-26 LAB
APPEARANCE UR: CLEAR
BACTERIA #/AREA URNS HPF: NORMAL /HPF
BACTERIA UR CULT: NORMAL
BACTERIA UR CULT: NORMAL
BILIRUB UR QL STRIP: NEGATIVE
CASTS URNS QL MICRO: NORMAL /LPF
COLOR UR: YELLOW
EPI CELLS #/AREA URNS HPF: NORMAL /HPF (ref 0–10)
GLUCOSE UR QL STRIP: NEGATIVE
HGB UR QL STRIP: NEGATIVE
KETONES UR QL STRIP: NEGATIVE
LEUKOCYTE ESTERASE UR QL STRIP: ABNORMAL
MICRO URNS: ABNORMAL
NITRITE UR QL STRIP: NEGATIVE
PH UR STRIP: 5.5 [PH] (ref 5–7.5)
PROT UR QL STRIP: NEGATIVE
RBC #/AREA URNS HPF: NORMAL /HPF (ref 0–2)
SP GR UR STRIP: 1.01 (ref 1–1.03)
URINALYSIS REFLEX: ABNORMAL
UROBILINOGEN UR STRIP-MCNC: 0.2 MG/DL (ref 0.2–1)
WBC #/AREA URNS HPF: NORMAL /HPF (ref 0–5)

## 2022-06-28 ENCOUNTER — POSTPARTUM VISIT (OUTPATIENT)
Dept: OBSTETRICS AND GYNECOLOGY | Facility: CLINIC | Age: 32
End: 2022-06-28

## 2022-06-28 VITALS
SYSTOLIC BLOOD PRESSURE: 118 MMHG | WEIGHT: 181.8 LBS | HEIGHT: 70 IN | BODY MASS INDEX: 26.03 KG/M2 | DIASTOLIC BLOOD PRESSURE: 60 MMHG

## 2022-06-28 PROCEDURE — 0503F POSTPARTUM CARE VISIT: CPT | Performed by: OBSTETRICS & GYNECOLOGY

## 2022-06-28 NOTE — PROGRESS NOTES
"6 week postpartum visit      Kia Allan is a 32 y.o.  s/p Vaginal delivery at 39 weeks 4 days on 22, who presents today for a 6 week postpartum check.      Patient reports postpartum depression. She reports she did start on her Zoloft 2 days ago after her facial swelling resolved.  Patient describes bleeding as absent, she reports some random spotting yesterday that has since stopped.  Patient is breast feeding.  Desires IUD for contraception.  Reports bowel or bladder issues. She reports she has had difficulty with bowel movements and thinks she may have a hemorrhoid. She states she has tried prune juice, drinking water, increasing fiber in diet. She also reports she is not voiding as much as normally and her water intake has not changed. She reports only 2 voids today so far.    Date of Last Pap: 21 neg    Last Completed Pap Smear          PAP SMEAR (Every 3 Years) Next due on 2021  SCANNED - PAP SMEAR    2018  Done - Negative, GC/Chl Negative, Yeast +                 H/x of abnormal: no    PHYSICAL EXAM:    /60   Ht 177.8 cm (70\")   Wt 82.5 kg (181 lb 12.8 oz)   BMI 26.09 kg/m²   Abdomen: +BS, benign, no masses, soft, non-tender.  Bimanual exam: deferred  Extremities: No deep calf tenderness.  Postpartum Depression Screening Questionnaire: 14,  treatment indicated- already started on Zoloft   Baby Name: Donaldo    IMPRESSION/PLAN:  32 y.o.  s/p Vaginal delivery, 6 weeks postpartum.  Doing well  - Recovered nicely from her delivery  - Return to normal physical activity  - Contraception: contraceptive methods: IUD.  Insertion date: she will f/u for IUD    Cheryle Kaplan MD  2022  "

## 2022-07-18 ENCOUNTER — OFFICE VISIT (OUTPATIENT)
Dept: OBSTETRICS AND GYNECOLOGY | Facility: CLINIC | Age: 32
End: 2022-07-18

## 2022-07-18 VITALS
BODY MASS INDEX: 25.48 KG/M2 | DIASTOLIC BLOOD PRESSURE: 70 MMHG | WEIGHT: 178 LBS | SYSTOLIC BLOOD PRESSURE: 120 MMHG | HEIGHT: 70 IN

## 2022-07-18 DIAGNOSIS — Z30.430 ENCOUNTER FOR IUD INSERTION: Primary | ICD-10-CM

## 2022-07-18 LAB
B-HCG UR QL: NEGATIVE
EXPIRATION DATE: NORMAL
INTERNAL NEGATIVE CONTROL: NEGATIVE
INTERNAL POSITIVE CONTROL: POSITIVE
Lab: NORMAL

## 2022-07-18 PROCEDURE — 58300 INSERT INTRAUTERINE DEVICE: CPT | Performed by: OBSTETRICS & GYNECOLOGY

## 2022-07-18 PROCEDURE — 81025 URINE PREGNANCY TEST: CPT | Performed by: OBSTETRICS & GYNECOLOGY

## 2022-07-18 NOTE — PROGRESS NOTES
Gynecologic Procedure Note        Procedure: IUD Insertion     Procedures    Pre procedure indication 1) Desires Mirena  Post procedure indication 1) Desires Mirena    NDC: Mirena 48971-945-67  Lot #: [id number]   Exp Date: 08/2024  BH device    The risks, benefits, and alternatives to Mirena were explained at length with the patient. All her questions were answered and consents were signed.  Her LMP was 03/20/2021 .  Urine Pregnancy Test was Negative.      The patient was placed in a dorsal lithotomy position on the examining table in [facility]. A bimanual exam confirmed the uterus was anteverted. A speculum was inserted into the vagina and the cervix was brought into view.  The cervix was prepped with Betadine. The anterior lip of the cervix was then grasped with a single-tooth tenaculum. The endometrial cavity was then sounded to 7 centimeters. The sealed Mirena package was opened and the IUD was removed in a sterile fashion.    The upper edge of the depth setting the flange was set at the uterine sound measurement. The  was then carefully advanced to the cervical canal into the uterus to the level of the fundus.  The slider was then retracted about 1 cm and deployed the device. The device was then gently advanced to the fundus. The IUD was then released by pulling the slider down all the way. The  was removed carefully from the uterus. The threads were then cut leaving 2-3 cm visible outside of the cervix.  The single-tooth tenaculum was removed from the anterior lip. Good hemostasis was noted.   All other instruments were removed from the vagina.       The patient tolerated the procedure well with a moderate amount of discomfort.  She was monitored for 10 minutes prior to discharge.      There were no complications.    The patient was counseled about the need to return in 4 weeks for IUD check.     She was counseled about the need to use a backup method of contraception such as condoms until  her post insertion exam was performed. The patient verbalized understanding when the IUD will need to be removed/replaced. Written information was given to the patient.  The patient is counseled to contact us if she has any significant or increasing bleeding, pain, fever, chills, or other concerns. She is instructed to see a doctor right away if she believes that she may be pregnant at any time with the IUD in place.    Cheryle Kaplan MD  07/18/2022

## 2022-08-16 ENCOUNTER — OFFICE VISIT (OUTPATIENT)
Dept: OBSTETRICS AND GYNECOLOGY | Facility: CLINIC | Age: 32
End: 2022-08-16

## 2022-08-16 VITALS
HEIGHT: 70 IN | SYSTOLIC BLOOD PRESSURE: 118 MMHG | BODY MASS INDEX: 25.2 KG/M2 | DIASTOLIC BLOOD PRESSURE: 62 MMHG | WEIGHT: 176 LBS

## 2022-08-16 DIAGNOSIS — Z30.431 IUD CHECK UP: Primary | ICD-10-CM

## 2022-08-16 PROCEDURE — 99212 OFFICE O/P EST SF 10 MIN: CPT | Performed by: NURSE PRACTITIONER

## 2022-08-16 NOTE — PROGRESS NOTES
"    Chief Complaint   Patient presents with   • IUD Check          Subjective   HPI  Kia Allan is a 32 y.o. female, , who presents for IUD check follow up.  She had a Mirena placed on 2022. Since the IUD placement, the patient reports  spotting since the day of insertion and cramping intermittently. She has not had a period since the IUD was placed.    The additional following portions of the patient's history were reviewed and updated as appropriate: allergies and current medications.    Did the patient have u/s today? Yes.  Findings showed IUD had correct placement in the fundal position  I have personally evaluated the U/S and agree with the findings. Diane Hairston, APRN    Review of Systems   Constitutional: Negative.    Cardiovascular: Negative.    Gastrointestinal: Negative.    Genitourinary: Negative.    Psychiatric/Behavioral: Negative.      All other systems reviewed and are negative.     I have reviewed and agree with the HPI, ROS, and historical information as entered above. Diane Hairston, APRN    Objective   /62 (BP Location: Left arm, Patient Position: Sitting, Cuff Size: Adult)   Ht 177.8 cm (70\")   Wt 79.8 kg (176 lb)   Breastfeeding Yes   BMI 25.25 kg/m²     Physical Exam  Vitals and nursing note reviewed.   Constitutional:       Appearance: Normal appearance. She is well-developed and normal weight.   HENT:      Head: Normocephalic and atraumatic.   Cardiovascular:      Rate and Rhythm: Normal rate and regular rhythm.   Pulmonary:      Effort: Pulmonary effort is normal.      Breath sounds: Normal breath sounds.   Abdominal:      Palpations: Abdomen is soft. Abdomen is not rigid.   Musculoskeletal:      Cervical back: Normal range of motion. No muscular tenderness.   Skin:     General: Skin is warm and dry.   Neurological:      Mental Status: She is alert and oriented to person, place, and time.   Psychiatric:         Behavior: Behavior normal. "         Assessment & Plan     Assessment     Problem List Items Addressed This Visit    None     Visit Diagnoses     IUD check up    -  Primary          Plan     1. IUD placed correctly in the fundal position per US. Pt is having minimal spotting and cramping without significant problems or side effects.  2. FU annually and prn problems        Diane Hairston, APRN  08/16/2022

## 2022-12-02 ENCOUNTER — OFFICE VISIT (OUTPATIENT)
Dept: OBSTETRICS AND GYNECOLOGY | Facility: CLINIC | Age: 32
End: 2022-12-02

## 2022-12-02 VITALS
HEIGHT: 70 IN | DIASTOLIC BLOOD PRESSURE: 82 MMHG | WEIGHT: 196 LBS | BODY MASS INDEX: 28.06 KG/M2 | SYSTOLIC BLOOD PRESSURE: 122 MMHG

## 2022-12-02 DIAGNOSIS — Z30.432 ENCOUNTER FOR IUD REMOVAL: Primary | ICD-10-CM

## 2022-12-02 LAB
B-HCG UR QL: NEGATIVE
EXPIRATION DATE: NORMAL
INTERNAL NEGATIVE CONTROL: NORMAL
INTERNAL POSITIVE CONTROL: NORMAL
Lab: NORMAL

## 2022-12-02 PROCEDURE — 81025 URINE PREGNANCY TEST: CPT | Performed by: NURSE PRACTITIONER

## 2022-12-02 PROCEDURE — 58301 REMOVE INTRAUTERINE DEVICE: CPT | Performed by: NURSE PRACTITIONER

## 2022-12-02 NOTE — PROGRESS NOTES
Gynecologic Procedure Note        Procedure: IUD Removal     Procedures    Pre procedure indication     1) Desires Removal of IUD    Post procedure indication   1) Desires Removal of IUD    The patient presents today for removal of her IUD.  She requests removal of her IUD due to Side effect: bleeding, cramping, bloating and dyspareunia. She plans to use condoms for contraception. All her questions were answered and consents were signed.      The patient was placed in a dorsal lithotomy position on the examining table in stirrups.  A speculum was inserted into the vagina and the cervix was brought into view.  An IUD string was visualized.  The string was then grasped and removed intact with gentle traction.  There was a Minimal amount of bleeding and cramping.    All  instruments were removed from the vagina.       The patient tolerated the procedure well with a mild amount of discomfort.  She was monitored for 10 minutes prior to discharge.      There were no complications.    The patient was counseled on other options for birth control. She plans to use condoms for contraception.     Problem List Items Addressed This Visit    None  Visit Diagnoses     Encounter for IUD removal    -  Primary            Erika Liu, APRN  12/02/2022

## 2022-12-21 ENCOUNTER — TELEPHONE (OUTPATIENT)
Dept: OBSTETRICS AND GYNECOLOGY | Facility: CLINIC | Age: 32
End: 2022-12-21

## 2022-12-21 RX ORDER — FLUCONAZOLE 150 MG/1
TABLET ORAL
Qty: 2 TABLET | Refills: 0 | Status: SHIPPED | OUTPATIENT
Start: 2022-12-21 | End: 2023-03-16

## 2022-12-21 NOTE — TELEPHONE ENCOUNTER
HARI patient    S/w patient- patient states that she was seen in the OV on 12/02/2022 for IUD removal. Patient states that KN noticed redness and was going to call in Diflucan for her. Patient states that the medication was not called in and has tried Monistat OTC without relief in s/s. Patient reports vaginal itching, redness, and irritation for the past 2 weeks. Patient denies vaginal odor and vaginal discharge. Patient denies any new sexual partners. Informed patient that I will discuss this with a provider and CB with plan of care. She v/u.

## 2023-03-16 ENCOUNTER — OFFICE VISIT (OUTPATIENT)
Dept: OBSTETRICS AND GYNECOLOGY | Facility: CLINIC | Age: 33
End: 2023-03-16
Payer: COMMERCIAL

## 2023-03-16 VITALS
HEIGHT: 70 IN | DIASTOLIC BLOOD PRESSURE: 62 MMHG | SYSTOLIC BLOOD PRESSURE: 112 MMHG | BODY MASS INDEX: 28.15 KG/M2 | WEIGHT: 196.6 LBS

## 2023-03-16 DIAGNOSIS — Z32.01 POSITIVE PREGNANCY TEST: Primary | ICD-10-CM

## 2023-03-16 DIAGNOSIS — Z87.59 HISTORY OF ECTOPIC PREGNANCY: ICD-10-CM

## 2023-03-16 DIAGNOSIS — N90.7 VULVAR CYST: ICD-10-CM

## 2023-03-16 PROCEDURE — 99213 OFFICE O/P EST LOW 20 MIN: CPT | Performed by: NURSE PRACTITIONER
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
## 2023-03-17 LAB
HCG INTACT+B SERPL-ACNC: 352 MIU/ML
PROGEST SERPL-MCNC: 19 NG/ML

## 2023-03-20 ENCOUNTER — LAB (OUTPATIENT)
Dept: OBSTETRICS AND GYNECOLOGY | Facility: CLINIC | Age: 33
End: 2023-03-20
Payer: COMMERCIAL

## 2023-03-20 DIAGNOSIS — Z87.59 HISTORY OF ECTOPIC PREGNANCY: Primary | ICD-10-CM

## 2023-03-21 LAB
HCG INTACT+B SERPL-ACNC: NORMAL MIU/ML
PROGEST SERPL-MCNC: 17.7 NG/ML

## 2023-04-24 ENCOUNTER — ANCILLARY ORDERS (OUTPATIENT)
Dept: OBSTETRICS AND GYNECOLOGY | Facility: CLINIC | Age: 33
End: 2023-04-24

## 2023-04-24 ENCOUNTER — INITIAL PRENATAL (OUTPATIENT)
Dept: OBSTETRICS AND GYNECOLOGY | Facility: CLINIC | Age: 33
End: 2023-04-24
Payer: COMMERCIAL

## 2023-04-24 VITALS — SYSTOLIC BLOOD PRESSURE: 112 MMHG | DIASTOLIC BLOOD PRESSURE: 74 MMHG | WEIGHT: 191 LBS | BODY MASS INDEX: 27.41 KG/M2

## 2023-04-24 DIAGNOSIS — Z34.90 PREGNANCY AT EARLY STAGE: ICD-10-CM

## 2023-04-24 DIAGNOSIS — Z34.91 PRENATAL CARE IN FIRST TRIMESTER: Primary | ICD-10-CM
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
## 2023-04-26 LAB
ABO GROUP BLD: NORMAL
AMPHETAMINES UR QL SCN: NEGATIVE NG/ML
BACTERIA UR CULT: NORMAL
BACTERIA UR CULT: NORMAL
BARBITURATES UR QL SCN: NEGATIVE NG/ML
BASOPHILS # BLD AUTO: 0 X10E3/UL (ref 0–0.2)
BASOPHILS NFR BLD AUTO: 0 %
BENZODIAZ UR QL SCN: NEGATIVE NG/ML
BLD GP AB SCN SERPL QL: NEGATIVE
BZE UR QL SCN: NEGATIVE NG/ML
C TRACH RRNA SPEC QL NAA+PROBE: NEGATIVE
CANNABINOIDS UR QL SCN: NEGATIVE NG/ML
CREAT UR-MCNC: 214.6 MG/DL (ref 20–300)
EOSINOPHIL # BLD AUTO: 0.1 X10E3/UL (ref 0–0.4)
EOSINOPHIL NFR BLD AUTO: 1 %
ERYTHROCYTE [DISTWIDTH] IN BLOOD BY AUTOMATED COUNT: 13.1 % (ref 11.7–15.4)
HBV SURFACE AG SERPL QL IA: NEGATIVE
HCT VFR BLD AUTO: 38.9 % (ref 34–46.6)
HCV IGG SERPL QL IA: NON REACTIVE
HGB BLD-MCNC: 13 G/DL (ref 11.1–15.9)
HIV 1+2 AB+HIV1 P24 AG SERPL QL IA: NON REACTIVE
IMM GRANULOCYTES # BLD AUTO: 0 X10E3/UL (ref 0–0.1)
IMM GRANULOCYTES NFR BLD AUTO: 0 %
LABORATORY COMMENT REPORT: NORMAL
LYMPHOCYTES # BLD AUTO: 1.5 X10E3/UL (ref 0.7–3.1)
LYMPHOCYTES NFR BLD AUTO: 20 %
MCH RBC QN AUTO: 29 PG (ref 26.6–33)
MCHC RBC AUTO-ENTMCNC: 33.4 G/DL (ref 31.5–35.7)
MCV RBC AUTO: 87 FL (ref 79–97)
METHADONE UR QL SCN: NEGATIVE NG/ML
MONOCYTES # BLD AUTO: 0.4 X10E3/UL (ref 0.1–0.9)
MONOCYTES NFR BLD AUTO: 5 %
N GONORRHOEA RRNA SPEC QL NAA+PROBE: NEGATIVE
NEUTROPHILS # BLD AUTO: 5.4 X10E3/UL (ref 1.4–7)
NEUTROPHILS NFR BLD AUTO: 74 %
OPIATES UR QL SCN: NEGATIVE NG/ML
OXYCODONE+OXYMORPHONE UR QL SCN: NEGATIVE NG/ML
PCP UR QL: NEGATIVE NG/ML
PH UR: 7.4 [PH] (ref 4.5–8.9)
PLATELET # BLD AUTO: 257 X10E3/UL (ref 150–450)
PROPOXYPH UR QL SCN: NEGATIVE NG/ML
RBC # BLD AUTO: 4.48 X10E6/UL (ref 3.77–5.28)
RH BLD: POSITIVE
RPR SER QL: NON REACTIVE
RUBV IGG SERPL IA-ACNC: 3.61 INDEX
WBC # BLD AUTO: 7.3 X10E3/UL (ref 3.4–10.8)

## 2023-05-02 ENCOUNTER — TELEPHONE (OUTPATIENT)
Dept: OBSTETRICS AND GYNECOLOGY | Facility: CLINIC | Age: 33
End: 2023-05-02
Payer: COMMERCIAL

## 2023-05-02 NOTE — TELEPHONE ENCOUNTER
Patient notified regarding lab results. [id number] still pending. Advised patient to give it another day or 2. Per Janice results take approximately 1 week.

## 2023-05-03 LAB
CFDNA.FET/CFDNA.TOTAL SFR FETUS: NORMAL %
CITATION REF LAB TEST: NORMAL
FET 13+18+21+X+Y ANEUP PLAS.CFDNA: NEGATIVE
FET CHR 21 TS PLAS.CFDNA QL: NEGATIVE
FET SEX PLAS.CFDNA DOSAGE CFDNA: NORMAL
FET TS 13 RISK PLAS.CFDNA QL: NEGATIVE
FET TS 18 RISK WBC.DNA+CFDNA QL: NEGATIVE
GA EST FROM CONCEPTION DATE: NORMAL D
GESTATIONAL AGE > 9:: YES
LAB DIRECTOR NAME PROVIDER: NORMAL
LAB DIRECTOR NAME PROVIDER: NORMAL
LABORATORY COMMENT REPORT: NORMAL
LIMITATIONS OF THE TEST: NORMAL
NEGATIVE PREDICTIVE VALUE: NORMAL
NOTE: NORMAL
PERFORMANCE CHARACTERISTICS: NORMAL
POSITIVE PREDICTIVE VALUE: NORMAL
REF LAB TEST METHOD: NORMAL
TEST PERFORMANCE INFO SPEC: NORMAL

## 2023-05-04 ENCOUNTER — TELEPHONE (OUTPATIENT)
Dept: OBSTETRICS AND GYNECOLOGY | Facility: CLINIC | Age: 33
End: 2023-05-04
Payer: COMMERCIAL

## 2023-05-07 PROBLEM — Z3A.38 38 WEEKS GESTATION OF PREGNANCY: Status: RESOLVED | Noted: 2022-05-10 | Resolved: 2023-05-07

## 2023-05-07 PROBLEM — Z34.90 ENCOUNTER FOR ELECTIVE INDUCTION OF LABOR: Status: RESOLVED | Noted: 2022-05-15 | Resolved: 2023-05-07

## 2023-05-07 PROBLEM — O26.899 FLANK PAIN IN PREGNANT PATIENT: Status: RESOLVED | Noted: 2022-05-05 | Resolved: 2023-05-07

## 2023-05-07 PROBLEM — Z34.91 PRENATAL CARE IN FIRST TRIMESTER: Status: ACTIVE | Noted: 2023-05-07

## 2023-05-07 PROBLEM — R10.9 FLANK PAIN IN PREGNANT PATIENT: Status: RESOLVED | Noted: 2022-05-05 | Resolved: 2023-05-07

## 2023-05-07 PROBLEM — Z3A.34 34 WEEKS GESTATION OF PREGNANCY: Status: RESOLVED | Noted: 2022-05-05 | Resolved: 2023-05-07

## 2023-05-22 ENCOUNTER — ROUTINE PRENATAL (OUTPATIENT)
Dept: OBSTETRICS AND GYNECOLOGY | Facility: CLINIC | Age: 33
End: 2023-05-22

## 2023-05-22 VITALS — BODY MASS INDEX: 27.12 KG/M2 | WEIGHT: 189 LBS | SYSTOLIC BLOOD PRESSURE: 110 MMHG | DIASTOLIC BLOOD PRESSURE: 78 MMHG

## 2023-05-22 DIAGNOSIS — Z34.82 MULTIGRAVIDA IN SECOND TRIMESTER: Primary | ICD-10-CM

## 2023-05-22 LAB
GLUCOSE UR STRIP-MCNC: NEGATIVE MG/DL
PROT UR STRIP-MCNC: NEGATIVE MG/DL

## 2023-05-22 RX ORDER — FLUCONAZOLE 150 MG/1
150 TABLET ORAL
Qty: 2 TABLET | Refills: 0 | Status: SHIPPED | OUTPATIENT
Start: 2023-05-22 | End: 2023-05-26

## 2023-05-22 NOTE — PROGRESS NOTES
OB FOLLOW UP  CC- Here for care of pregnancy        Kia Allan is a 33 y.o.  14w2d patient being seen today for her obstetrical follow up visit. Patient reports having some vaginal itching and burning.     Her prenatal care is complicated by (and status) : None  Patient Active Problem List   Diagnosis    Acute non-recurrent maxillary sinusitis    Allergic reaction    Postural dizziness with presyncope    36 weeks gestation of pregnancy    Psoriasis    Prenatal care in first trimester    Multigravida in second trimester      Ultrasound Today: No    AFP: is undecided about    ROS -   Patient Reports : Patient reports having some vaginal itching and burning.  Patient Denies: Loss of Fluid, Vaginal Spotting, Vision Changes, Headaches, Nausea , Vomiting , Contractions and Epigastric pain  Fetal Movement : absent  All other systems reviewed and are negative.       The additional following portions of the patient's history were reviewed and updated as appropriate: allergies, current medications, past family history, past medical history, past social history, past surgical history and problem list.    I have reviewed and agree with the HPI, ROS, and historical information as entered above. Cheryle Kaplan MD        EXAM:     Prenatal Vitals  BP: 110/78  Weight: 85.7 kg (189 lb)   Fetal Heart Rate: pos   Pelvic Exam:        Urine Glucose Read-only: Negative  Urine Protein Read-only: Negative           Assessment and Plan    Problem List Items Addressed This Visit       Multigravida in second trimester - Primary    Relevant Orders    POC Urinalysis Dipstick (Completed)       Pregnancy at 14w2d  Fetal status reassuring.   Counseled on MSAFP alone in relation to OTD and placental issues.    Anatomy scan next visit.   Activity and Exercise discussed.  Medication(s) Ordered  Patient is on Prenatal vitamins  Return in about 4 weeks (around 2023) for Next scheduled follow up.    Cheryle Kaplan  MD  05/22/2023

## 2023-06-03 PROBLEM — Z34.82 MULTIGRAVIDA IN SECOND TRIMESTER: Status: ACTIVE | Noted: 2023-06-03

## 2023-06-21 PROBLEM — Z34.91 PRENATAL CARE IN FIRST TRIMESTER: Status: RESOLVED | Noted: 2023-05-07 | Resolved: 2023-06-21

## 2023-06-21 PROBLEM — Z3A.36 36 WEEKS GESTATION OF PREGNANCY: Status: RESOLVED | Noted: 2022-04-25 | Resolved: 2023-06-21

## 2023-07-17 PROBLEM — N76.0 ACUTE VAGINITIS: Status: ACTIVE | Noted: 2023-07-17

## 2023-07-17 PROBLEM — Z34.82 PRENATAL CARE, SUBSEQUENT PREGNANCY IN SECOND TRIMESTER: Status: ACTIVE | Noted: 2023-07-17

## 2023-08-14 ENCOUNTER — ROUTINE PRENATAL (OUTPATIENT)
Dept: OBSTETRICS AND GYNECOLOGY | Facility: CLINIC | Age: 33
End: 2023-08-14
Payer: COMMERCIAL

## 2023-08-14 VITALS — DIASTOLIC BLOOD PRESSURE: 68 MMHG | WEIGHT: 190.8 LBS | BODY MASS INDEX: 27.38 KG/M2 | SYSTOLIC BLOOD PRESSURE: 100 MMHG

## 2023-08-14 DIAGNOSIS — Z34.82 MULTIGRAVIDA IN SECOND TRIMESTER: Primary | ICD-10-CM

## 2023-08-14 DIAGNOSIS — R21 RASH: ICD-10-CM

## 2023-08-14 LAB
GLUCOSE UR STRIP-MCNC: NEGATIVE MG/DL
PROT UR STRIP-MCNC: NEGATIVE MG/DL

## 2023-08-14 NOTE — PROGRESS NOTES
OB FOLLOW UP  CC- Here for care of pregnancy        Kia Allan is a 33 y.o.  26w2d patient being seen today for her obstetrical follow up visit. Patient reports having a pruritic rash that started on her left leg in her tattoo and now has a rash on  her left arm.      Her prenatal care is complicated by (and status) :   Patient Active Problem List   Diagnosis    Acute non-recurrent maxillary sinusitis    Allergic reaction    Postural dizziness with presyncope    Psoriasis    Multigravida in second trimester    Prenatal care, subsequent pregnancy in second trimester    Acute vaginitis          Ultrasound Today: No    ROS -   Patient Reports : Patient reports having a pruritic rash that started on her left leg in her tattoo and now has a rash on  her left arm.   Patient Denies: Loss of Fluid, Vaginal Spotting, Vision Changes, Headaches, Nausea , Vomiting , Contractions, and Epigastric pain  Fetal Movement : normal  All other systems reviewed and are negative.       The additional following portions of the patient's history were reviewed and updated as appropriate: allergies, current medications, past family history, past medical history, past social history, past surgical history, and problem list.      I have reviewed and agree with the HPI, ROS, and historical information as entered above. Cheryle Kaplan MD      /68   Wt 86.5 kg (190 lb 12.8 oz)   LMP 2023 (Exact Date)   BMI 27.38 kg/mý       EXAM:     Prenatal Vitals  BP: 100/68  Weight: 86.5 kg (190 lb 12.8 oz)   Fetal Heart Rate: pos      Fundal Height (cm): 28 cm        Urine Glucose Read-only: Negative  Urine Protein Read-only: Negative       Assessment and Plan    Problem List Items Addressed This Visit       Multigravida in second trimester - Primary    Overview     Anatomy  -INCOMPLETE- , 3VC, normal ANGELINE, AC 22%, EFW 1#1oz 35%, anatomy seen is normal, male. CL 38.1 mm         Relevant Orders    POC Urinalysis  Dipstick (Completed)       Pregnancy at 26w2d  Fetal status reassuring.  No US indicated today.  1 hour gtt, CBC, Antibody screen, and TDAP next visit. Instructions given  Discussed/encouraged TDAP vaccination after 28 weeks  Activity and Exercise discussed.  Rash appears allergic.  Encouraged benadryl, can try hydrocortisone cream, but I would try antifungal cream first.  If unresoved, can still refer to dermatology.  I'll put in a referral now that she can cancel if rash goes away.  Return in about 4 weeks (around 9/11/2023) for Next scheduled follow up.    Cheryle Kaplan MD  08/14/2023

## 2023-08-29 ENCOUNTER — ROUTINE PRENATAL (OUTPATIENT)
Dept: OBSTETRICS AND GYNECOLOGY | Facility: CLINIC | Age: 33
End: 2023-08-29
Payer: COMMERCIAL

## 2023-08-29 VITALS — DIASTOLIC BLOOD PRESSURE: 72 MMHG | WEIGHT: 193 LBS | SYSTOLIC BLOOD PRESSURE: 110 MMHG | BODY MASS INDEX: 27.69 KG/M2

## 2023-08-29 DIAGNOSIS — Z34.92 SECOND TRIMESTER PREGNANCY: Primary | ICD-10-CM

## 2023-08-29 LAB
GLUCOSE UR STRIP-MCNC: NEGATIVE MG/DL
PROT UR STRIP-MCNC: NEGATIVE MG/DL

## 2023-08-29 RX ORDER — METHYLPREDNISOLONE 4 MG/1
TABLET ORAL
Qty: 21 TABLET | Refills: 0 | Status: SHIPPED | OUTPATIENT
Start: 2023-08-29

## 2023-08-29 RX ORDER — HYDROXYZINE PAMOATE 25 MG/1
25 CAPSULE ORAL 3 TIMES DAILY PRN
Qty: 30 CAPSULE | Refills: 2 | Status: SHIPPED | OUTPATIENT
Start: 2023-08-29

## 2023-08-29 NOTE — PROGRESS NOTES
OB FOLLOW UP  CC- Here for care of pregnancy        Kia Allan is a 33 y.o.  28w3d patient being seen today for her obstetrical follow up. Patient reports RASH THAT HAS SPREAD FROM HER THIGHS TO HER ARMS.   Pt feels well, good fm. TDAP given tdy. Rash is getting worse.  Patient undergoing Glucola testing today. She is due for her testing at 12:10.       MBT: A+  Rhogam:  NOT INDICATED  28 week packet: reviewed with patient , counseled on fetal movement , pediatrician list reviewed, breast pump discussed, and childbirth classes reviewed  TDAP: given today  Flu Status: Already given in current flu season  Ultrasound Today: No    Her prenatal care is complicated by (and status) :  None  Patient Active Problem List   Diagnosis    Acute non-recurrent maxillary sinusitis    Allergic reaction    Postural dizziness with presyncope    Psoriasis    Multigravida in second trimester    Prenatal care, subsequent pregnancy in second trimester    Acute vaginitis    Second trimester pregnancy         ROS -   Patient Reports : No Problems  Patient Denies: Loss of Fluid, Vaginal Spotting, Vision Changes, Headaches, Nausea , Vomiting , Contractions, and Epigastric pain  Fetal Movement : normal    The additional following portions of the patient's history were reviewed and updated as appropriate: allergies, current medications, past family history, past medical history, past social history, past surgical history, and problem list.    I have reviewed and agree with the HPI, ROS, and historical information as entered above. Cheryle Kaplan MD      /72   Wt 87.5 kg (193 lb)   LMP 2023 (Exact Date)   BMI 27.69 kg/mý         EXAM:     Prenatal Vitals  BP: 110/72  Weight: 87.5 kg (193 lb)                   Urine Glucose Read-only: Negative  Urine Protein Read-only: Negative         Assessment and Plan    Problem List Items Addressed This Visit       Second trimester pregnancy - Primary    Relevant  Orders    CBC (No Diff) (Completed)    Gestational Screen 1 Hr ([facility]) (Completed)    Antibody Screen (Completed)    POC Urinalysis Dipstick (Completed)    Ambulatory Referral to Dermatology    Bile Acids, Total    Hepatic Function Panel       Pregnancy at 28w3d  1 hr Glucola, CBC, and antibody screen today  and TDAP given today  Fetal movement/PTL or Labor precautions  Appt at dermatology and prednisone johnathan  Activity and Exercise discussed.  Return in about 4 weeks (around 9/26/2023) for Next scheduled follow up.    Cheryle Kaplan MD  08/29/2023

## 2023-08-31 LAB
ALBUMIN SERPL-MCNC: 3.4 G/DL (ref 3.9–4.9)
ALP SERPL-CCNC: 82 IU/L (ref 44–121)
ALT SERPL-CCNC: 15 IU/L (ref 0–32)
AST SERPL-CCNC: 19 IU/L (ref 0–40)
BILE AC SERPL-SCNC: 1.7 UMOL/L (ref 0–10)
BILIRUB DIRECT SERPL-MCNC: <0.1 MG/DL (ref 0–0.4)
BILIRUB SERPL-MCNC: 0.2 MG/DL (ref 0–1.2)
BLD GP AB SCN SERPL QL: NEGATIVE
ERYTHROCYTE [DISTWIDTH] IN BLOOD BY AUTOMATED COUNT: 13.3 % (ref 11.7–15.4)
GLUCOSE 1H P 50 G GLC PO SERPL-MCNC: 142 MG/DL (ref 70–139)
HCT VFR BLD AUTO: 33.7 % (ref 34–46.6)
HGB BLD-MCNC: 11.1 G/DL (ref 11.1–15.9)
MCH RBC QN AUTO: 29.3 PG (ref 26.6–33)
MCHC RBC AUTO-ENTMCNC: 32.9 G/DL (ref 31.5–35.7)
MCV RBC AUTO: 89 FL (ref 79–97)
PLATELET # BLD AUTO: 236 X10E3/UL (ref 150–450)
PROT SERPL-MCNC: 5.9 G/DL (ref 6–8.5)
RBC # BLD AUTO: 3.79 X10E6/UL (ref 3.77–5.28)
WBC # BLD AUTO: 9.6 X10E3/UL (ref 3.4–10.8)

## 2023-09-03 PROBLEM — Z34.92 SECOND TRIMESTER PREGNANCY: Status: ACTIVE | Noted: 2023-09-03

## 2023-09-26 ENCOUNTER — ROUTINE PRENATAL (OUTPATIENT)
Dept: OBSTETRICS AND GYNECOLOGY | Facility: CLINIC | Age: 33
End: 2023-09-26
Payer: COMMERCIAL

## 2023-09-26 VITALS — WEIGHT: 199 LBS | SYSTOLIC BLOOD PRESSURE: 110 MMHG | BODY MASS INDEX: 28.55 KG/M2 | DIASTOLIC BLOOD PRESSURE: 72 MMHG

## 2023-09-26 DIAGNOSIS — Z34.93 THIRD TRIMESTER PREGNANCY: Primary | ICD-10-CM

## 2023-09-26 LAB
GLUCOSE UR STRIP-MCNC: NEGATIVE MG/DL
PROT UR STRIP-MCNC: NEGATIVE MG/DL

## 2023-09-26 NOTE — PROGRESS NOTES
OB FOLLOW UP  CC- Here for care of pregnancy        Kia Allan is a 33 y.o.  32w3d patient being seen today for her obstetrical follow up visit. Patient reports no complaints. Pt feels well, good fm, itching is better. Will do 3hr gtt this friday    Her prenatal care is complicated by (and status) :  None  Patient Active Problem List   Diagnosis    Acute non-recurrent maxillary sinusitis    Allergic reaction    Postural dizziness with presyncope    Psoriasis    Multigravida in second trimester    Prenatal care, subsequent pregnancy in second trimester    Acute vaginitis    Second trimester pregnancy    Third trimester pregnancy       Flu Status: Desires at future appt  TDAP status: received at last visit  Rhogam status: was not indicated  28 week labs: Reviewed and failed 1 hour gtt  Ultrasound Today: No  Non Stress Test: No.      ROS -   Patient Reports : No Problems  Patient Denies: Loss of Fluid, Vaginal Spotting, Vision Changes, Headaches, Nausea , Vomiting , Contractions, and Epigastric pain  Fetal Movement : normal  All other systems reviewed and are negative.       The additional following portions of the patient's history were reviewed and updated as appropriate: allergies, current medications, past family history, past medical history, past social history, past surgical history, and problem list.    I have reviewed and agree with the HPI, ROS, and historical information as entered above. Cheryle Kaplan MD      /72   Wt 90.3 kg (199 lb)   LMP 2023 (Exact Date)   BMI 28.55 kg/m²         EXAM:     Prenatal Vitals  BP: 110/72  Weight: 90.3 kg (199 lb)   Fetal Heart Rate: pos      Fundal Height (cm): 33 cm        Urine Glucose Read-only: Negative  Urine Protein Read-only: Negative           Assessment and Plan    Problem List Items Addressed This Visit       Third trimester pregnancy - Primary    Relevant Orders    POC Urinalysis Dipstick (Completed)       Pregnancy at  32w3d  Fetal status reassuring.  28 week labs reviewed.    Activity and Exercise discussed.  Fetal movement/PTL or Labor precautions  Reviewed Pre-eclampsia signs/symptoms  Reviewed PP contraception options  Return in about 2 weeks (around 10/10/2023) for Next scheduled follow up.    Cheryle Kaplan MD  09/26/2023

## 2023-09-29 ENCOUNTER — LAB (OUTPATIENT)
Dept: OBSTETRICS AND GYNECOLOGY | Facility: CLINIC | Age: 33
End: 2023-09-29
Payer: COMMERCIAL

## 2023-09-29 DIAGNOSIS — R73.09 ELEVATED GLUCOSE TOLERANCE TEST: Primary | ICD-10-CM

## 2023-09-29 LAB
GLUCOSE 1H P 100 G GLC PO SERPL-MCNC: 102 MG/DL (ref 65–179)
GLUCOSE 2H P 100 G GLC PO SERPL-MCNC: 104 MG/DL (ref 65–154)
GLUCOSE 3H P 100 G GLC PO SERPL-MCNC: 75 MG/DL (ref 65–139)
GLUCOSE P FAST SERPL-MCNC: 63 MG/DL (ref 65–94)

## 2023-10-03 ENCOUNTER — TELEPHONE (OUTPATIENT)
Dept: OBSTETRICS AND GYNECOLOGY | Facility: CLINIC | Age: 33
End: 2023-10-03
Payer: COMMERCIAL

## 2023-10-08 PROBLEM — Z34.93 THIRD TRIMESTER PREGNANCY: Status: ACTIVE | Noted: 2023-10-08

## 2023-10-10 ENCOUNTER — ROUTINE PRENATAL (OUTPATIENT)
Dept: OBSTETRICS AND GYNECOLOGY | Facility: CLINIC | Age: 33
End: 2023-10-10
Payer: COMMERCIAL

## 2023-10-10 VITALS — DIASTOLIC BLOOD PRESSURE: 76 MMHG | BODY MASS INDEX: 28.84 KG/M2 | SYSTOLIC BLOOD PRESSURE: 102 MMHG | WEIGHT: 201 LBS

## 2023-10-10 DIAGNOSIS — Z34.83 MULTIGRAVIDA IN THIRD TRIMESTER: Primary | ICD-10-CM

## 2023-10-10 PROBLEM — Z34.92 SECOND TRIMESTER PREGNANCY: Status: RESOLVED | Noted: 2023-09-03 | Resolved: 2023-10-10

## 2023-10-10 LAB
GLUCOSE UR STRIP-MCNC: NEGATIVE MG/DL
PROT UR STRIP-MCNC: NEGATIVE MG/DL

## 2023-10-10 NOTE — PROGRESS NOTES
OB FOLLOW UP  CC- Here for care of pregnancy        Kia Allan is a 33 y.o.  34w3d patient being seen today for her obstetrical follow up visit. Patient reports no complaints..     Her prenatal care is complicated by (and status) : None  Patient Active Problem List   Diagnosis    Acute non-recurrent maxillary sinusitis    Allergic reaction    Postural dizziness with presyncope    Psoriasis    Prenatal care, subsequent pregnancy in second trimester    Acute vaginitis    Third trimester pregnancy    Multigravida in third trimester       Flu Status: Declines  Ultrasound Today: No  Non Stress Test: No.    ROS -   Patient Reports : No Problems  Patient Denies: Loss of Fluid, Vaginal Spotting, Vision Changes, Headaches, Nausea , Vomiting , Contractions, and Epigastric pain  Fetal Movement : normal  All other systems reviewed and are negative.       The additional following portions of the patient's history were reviewed and updated as appropriate: allergies, current medications, past family history, past medical history, past social history, past surgical history, and problem list.    I have reviewed and agree with the HPI, ROS, and historical information as entered above. Cheryle Kaplan MD      /76   Wt 91.2 kg (201 lb)   LMP 2023 (Exact Date)   BMI 28.84 kg/m²       EXAM:     Prenatal Vitals  BP: 102/76  Weight: 91.2 kg (201 lb)   Fetal Heart Rate: 150      Fundal Height (cm): 33 cm        Urine Glucose Read-only: Negative  Urine Protein Read-only: Negative           Assessment and Plan    Problem List Items Addressed This Visit       Multigravida in third trimester - Primary    Relevant Orders    POC Urinalysis Dipstick (Completed)       Pregnancy at 34w3d  Fetal status reassuring.   Activity and Exercise discussed.  Fetal movement/PTL or Labor precautions  GBS next visit  Return in about 2 weeks (around 10/24/2023) for Next scheduled follow up.    Cheryle Kaplan  MD  10/10/2023

## 2023-10-21 PROBLEM — Z34.82 MULTIGRAVIDA IN SECOND TRIMESTER: Status: RESOLVED | Noted: 2023-06-03 | Resolved: 2023-10-21

## 2023-10-24 ENCOUNTER — ROUTINE PRENATAL (OUTPATIENT)
Dept: OBSTETRICS AND GYNECOLOGY | Facility: CLINIC | Age: 33
End: 2023-10-24
Payer: COMMERCIAL

## 2023-10-24 ENCOUNTER — LAB (OUTPATIENT)
Dept: LAB | Facility: HOSPITAL | Age: 33
End: 2023-10-24
Payer: COMMERCIAL

## 2023-10-24 VITALS — BODY MASS INDEX: 29.04 KG/M2 | WEIGHT: 202.4 LBS | SYSTOLIC BLOOD PRESSURE: 114 MMHG | DIASTOLIC BLOOD PRESSURE: 72 MMHG

## 2023-10-24 DIAGNOSIS — Z34.83 MULTIGRAVIDA IN THIRD TRIMESTER: Primary | ICD-10-CM

## 2023-10-24 DIAGNOSIS — O36.5930 POOR FETAL GROWTH AFFECTING MANAGEMENT OF MOTHER IN THIRD TRIMESTER, SINGLE OR UNSPECIFIED FETUS: ICD-10-CM

## 2023-10-24 DIAGNOSIS — Z34.83 PRENATAL CARE, SUBSEQUENT PREGNANCY, THIRD TRIMESTER: Primary | ICD-10-CM

## 2023-10-24 LAB
GLUCOSE UR STRIP-MCNC: NEGATIVE MG/DL
PROT UR STRIP-MCNC: NEGATIVE MG/DL

## 2023-10-24 PROCEDURE — 0502F SUBSEQUENT PRENATAL CARE: CPT | Performed by: OBSTETRICS & GYNECOLOGY

## 2023-10-24 PROCEDURE — 87081 CULTURE SCREEN ONLY: CPT

## 2023-10-24 NOTE — PROGRESS NOTES
OB FOLLOW UP  CC- Here for care of pregnancy        Kia Allan is a 33 y.o.  36w3d patient being seen today for her obstetrical follow up visit. Patient reports feeling like she had to urinate on Saturday but when she went to the bathroom clear fluid came out on it's own. Since then she has leaked in her underwear only one other time.     Her prenatal care is complicated by (and status) : None  Patient Active Problem List   Diagnosis    Acute non-recurrent maxillary sinusitis    Allergic reaction    Postural dizziness with presyncope    Psoriasis    Prenatal care, subsequent pregnancy in second trimester    Acute vaginitis    Third trimester pregnancy    Multigravida in third trimester    Poor fetal growth affecting management of mother in third trimester       GBS Status: Done Today. She is not allergic to PCN.    Allergies   Allergen Reactions    Sulfa Antibiotics Anaphylaxis    Polyethylene Glycol Other (See Comments)     Showed up on allergy test    Propolis Rash          Flu Status: Declines  Her Delivery Plan is: Desires IOL at 39wks. Scheduled    US today: no  Non Stress Test: No.    ROS -   Patient Reports :  Patient reports feeling like she had to urinate on Saturday but when she went to the bathroom clear fluid came out on it's own. Since then she has leaked in her underwear only one other time.   Patient Denies: Vaginal Spotting, Vision Changes, Headaches, Nausea , Vomiting , Contractions, and Epigastric pain  Fetal Movement : normal  All other systems reviewed and are negative.       The additional following portions of the patient's history were reviewed and updated as appropriate: allergies, current medications, past family history, past medical history, past social history, past surgical history, and problem list.    I have reviewed and agree with the HPI, ROS, and historical information as entered above. Cheryle Kaplan MD        EXAM:     Prenatal Vitals  BP: 114/72  Weight:  91.8 kg (202 lb 6.4 oz)   Fetal Heart Rate: 140   Fundal Height (cm): 34 cm   Dilation/Effacement/Station  Dilation: 1  Effacement (%): 60  Station: -3      Urine Glucose Read-only: Negative  Urine Protein Read-only: Negative           Assessment and Plan    Problem List Items Addressed This Visit       Multigravida in third trimester - Primary    Relevant Orders    Group B Streptococcus Culture - Swab, Vaginal/Rectum    POC Urinalysis Dipstick (Completed)    US Ob Follow Up Transabdominal Approach    US Fetal Biophysical Profile;Without Non-Stress Testing    Poor fetal growth affecting management of mother in third trimester    Relevant Orders    US Ob Follow Up Transabdominal Approach    US Fetal Biophysical Profile;Without Non-Stress Testing       Pregnancy at 36w3d  Fetal status reassuring.   Lab(s) Ordered  U/S ordered at follow up  Reviewed Pre-eclampsia signs/symptoms  Reviewed upcoming IOL with patient. Instructions given.  Delivery options reviewed with patient  Signs of labor reviewed  Kick counts reviewed  Activity and Exercise discussed.  Return in about 1 week (around 10/31/2023) for WITH SONO.    Cheryle Kaplan MD  10/24/2023

## 2023-10-27 LAB — BACTERIA SPEC AEROBE CULT: ABNORMAL

## 2023-11-03 ENCOUNTER — TELEPHONE (OUTPATIENT)
Dept: OBSTETRICS AND GYNECOLOGY | Facility: CLINIC | Age: 33
End: 2023-11-03
Payer: COMMERCIAL

## 2023-11-03 ENCOUNTER — ROUTINE PRENATAL (OUTPATIENT)
Dept: OBSTETRICS AND GYNECOLOGY | Facility: CLINIC | Age: 33
End: 2023-11-03
Payer: COMMERCIAL

## 2023-11-03 VITALS — DIASTOLIC BLOOD PRESSURE: 70 MMHG | SYSTOLIC BLOOD PRESSURE: 118 MMHG | WEIGHT: 202.8 LBS | BODY MASS INDEX: 29.1 KG/M2

## 2023-11-03 DIAGNOSIS — Z34.83 PRENATAL CARE, SUBSEQUENT PREGNANCY, THIRD TRIMESTER: Primary | ICD-10-CM

## 2023-11-03 LAB
EXPIRATION DATE: 0
GLUCOSE UR STRIP-MCNC: NEGATIVE MG/DL
Lab: 0
PROT UR STRIP-MCNC: NEGATIVE MG/DL

## 2023-11-03 NOTE — PROGRESS NOTES
OB FOLLOW UP  CC- Here for care of pregnancy        Kia Allan is a 33 y.o.  37w6d patient being seen today for her obstetrical follow up visit. Patient reports abdominal soreness(baby has been active) and increased discharge(started today).     Her prenatal care is complicated by (and status) :   Patient Active Problem List   Diagnosis    Acute non-recurrent maxillary sinusitis    Allergic reaction    Postural dizziness with presyncope    Psoriasis    Prenatal care, subsequent pregnancy in second trimester    Acute vaginitis    Third trimester pregnancy    Multigravida in third trimester    Poor fetal growth affecting management of mother in third trimester       GBS Status:   Group B Strep Culture   Date Value Ref Range Status   10/24/2023 Streptococcus agalactiae (Group B) (A)  Final     Comment:       This organism is considered to be universally susceptible to penicillin.  No further antibiotic testing will be performed. If Clindamycin or Erythromycin is the drug of choice, notify the laboratory within 7 days to request susceptibility testing.         Allergies   Allergen Reactions    Sulfa Antibiotics Anaphylaxis    Polyethylene Glycol Other (See Comments)     Showed up on allergy test    Propolis Rash          Flu Status: Declines  Her Delivery Plan is:  IOL 2023 at 12:00 AM     US today: yes  Non Stress Test: No.    ROS -   Patient Reports :  abdominal soreness and increased discharge  Patient Denies: Loss of Fluid, Vaginal Spotting, Vision Changes, Headaches, Nausea , and Vomiting   Fetal Movement : normal  All other systems reviewed and are negative.       The additional following portions of the patient's history were reviewed and updated as appropriate: allergies and current medications.    I have reviewed and agree with the HPI, ROS, and historical information as entered above. Erika Liu, APRN        EXAM:     Prenatal Vitals  BP: 118/70  Weight: 92 kg (202 lb 12.8 oz)    Fetal Heart Rate: US       Dilation/Effacement/Station  Dilation: 3  Effacement (%): 50  Station: 2      Urine Glucose Read-only: Negative  Urine Protein Read-only: Negative           Assessment and Plan    Problem List Items Addressed This Visit    None  Visit Diagnoses       Prenatal care, subsequent pregnancy, third trimester    -  Primary    Relevant Orders    POC Glucose, Urine, Qualitative, Dipstick (Completed)    POC Protein, Urine, Qualitative, Dipstick (Completed)    Dion breech presentation, single or unspecified fetus                Pregnancy at 37w6d  Fetal status reassuring.   Reviewed Pre-eclampsia signs/symptoms  Discussed options of outright  vs version for breech presentation.  Pt. elects for ECV.  Rev risks.  Will schedule.  Rev with AYUSH  Delivery options reviewed with patient  Signs of labor reviewed  Kick counts reviewed  Activity and Exercise discussed.  Return in about 1 week (around 11/10/2023).    Erika Liu, APRN  2023

## 2023-11-04 ENCOUNTER — ANESTHESIA EVENT (OUTPATIENT)
Dept: LABOR AND DELIVERY | Facility: HOSPITAL | Age: 33
End: 2023-11-04
Payer: COMMERCIAL

## 2023-11-04 ENCOUNTER — HOSPITAL ENCOUNTER (INPATIENT)
Facility: HOSPITAL | Age: 33
LOS: 3 days | Discharge: HOME | End: 2023-11-07
Attending: OBSTETRICS & GYNECOLOGY | Admitting: OBSTETRICS & GYNECOLOGY
Payer: COMMERCIAL

## 2023-11-04 DIAGNOSIS — Z98.891 STATUS POST CESAREAN SECTION: Primary | ICD-10-CM

## 2023-11-04 LAB
ABO GROUP BLD: NORMAL
ALP SERPL-CCNC: 130 U/L (ref 39–117)
ALT SERPL W P-5'-P-CCNC: 10 U/L (ref 1–33)
AST SERPL-CCNC: 15 U/L (ref 1–32)
BILIRUB SERPL-MCNC: 0.2 MG/DL (ref 0–1.2)
BLD GP AB SCN SERPL QL: NEGATIVE
CREAT SERPL-MCNC: 0.69 MG/DL (ref 0.57–1)
DEPRECATED RDW RBC AUTO: 40.3 FL (ref 37–54)
ERYTHROCYTE [DISTWIDTH] IN BLOOD BY AUTOMATED COUNT: 13.2 % (ref 12.3–15.4)
HCT VFR BLD AUTO: 32.7 % (ref 34–46.6)
HGB BLD-MCNC: 11.4 G/DL (ref 12–15.9)
LDH SERPL-CCNC: 185 U/L (ref 135–214)
MCH RBC QN AUTO: 29.4 PG (ref 26.6–33)
MCHC RBC AUTO-ENTMCNC: 34.9 G/DL (ref 31.5–35.7)
MCV RBC AUTO: 84.3 FL (ref 79–97)
PLATELET # BLD AUTO: 232 10*3/MM3 (ref 140–450)
PMV BLD AUTO: 10.8 FL (ref 6–12)
RBC # BLD AUTO: 3.88 10*6/MM3 (ref 3.77–5.28)
RH BLD: POSITIVE
T&S EXPIRATION DATE: NORMAL
URATE SERPL-MCNC: 5.8 MG/DL (ref 2.4–5.7)
WBC NRBC COR # BLD: 14.31 10*3/MM3 (ref 3.4–10.8)

## 2023-11-04 PROCEDURE — 25010000002 ONDANSETRON PER 1 MG: Performed by: ANESTHESIOLOGY

## 2023-11-04 PROCEDURE — 25810000003 LACTATED RINGERS SOLUTION: Performed by: OBSTETRICS & GYNECOLOGY

## 2023-11-04 PROCEDURE — 25010000002 METOCLOPRAMIDE PER 10 MG: Performed by: ANESTHESIOLOGY

## 2023-11-04 PROCEDURE — 25010000002 MORPHINE PER 10 MG: Performed by: ANESTHESIOLOGY

## 2023-11-04 PROCEDURE — 82247 BILIRUBIN TOTAL: CPT | Performed by: OBSTETRICS & GYNECOLOGY

## 2023-11-04 PROCEDURE — 86850 RBC ANTIBODY SCREEN: CPT | Performed by: OBSTETRICS & GYNECOLOGY

## 2023-11-04 PROCEDURE — 59025 FETAL NON-STRESS TEST: CPT | Performed by: OBSTETRICS & GYNECOLOGY

## 2023-11-04 PROCEDURE — 85027 COMPLETE CBC AUTOMATED: CPT | Performed by: OBSTETRICS & GYNECOLOGY

## 2023-11-04 PROCEDURE — 84075 ASSAY ALKALINE PHOSPHATASE: CPT | Performed by: OBSTETRICS & GYNECOLOGY

## 2023-11-04 PROCEDURE — 84550 ASSAY OF BLOOD/URIC ACID: CPT | Performed by: OBSTETRICS & GYNECOLOGY

## 2023-11-04 PROCEDURE — 86900 BLOOD TYPING SEROLOGIC ABO: CPT | Performed by: OBSTETRICS & GYNECOLOGY

## 2023-11-04 PROCEDURE — S0260 H&P FOR SURGERY: HCPCS | Performed by: OBSTETRICS & GYNECOLOGY

## 2023-11-04 PROCEDURE — 25810000003 LACTATED RINGERS PER 1000 ML: Performed by: ANESTHESIOLOGY

## 2023-11-04 PROCEDURE — 83615 LACTATE (LD) (LDH) ENZYME: CPT | Performed by: OBSTETRICS & GYNECOLOGY

## 2023-11-04 PROCEDURE — 25010000002 BUPIVACAINE IN DEXTROSE 0.75-8.25 % SOLUTION: Performed by: ANESTHESIOLOGY

## 2023-11-04 PROCEDURE — 82565 ASSAY OF CREATININE: CPT | Performed by: OBSTETRICS & GYNECOLOGY

## 2023-11-04 PROCEDURE — 84450 TRANSFERASE (AST) (SGOT): CPT | Performed by: OBSTETRICS & GYNECOLOGY

## 2023-11-04 PROCEDURE — 25010000002 FENTANYL CITRATE (PF) 50 MCG/ML SOLUTION: Performed by: ANESTHESIOLOGY

## 2023-11-04 PROCEDURE — 25010000002 CEFAZOLIN PER 500 MG: Performed by: OBSTETRICS & GYNECOLOGY

## 2023-11-04 PROCEDURE — 84460 ALANINE AMINO (ALT) (SGPT): CPT | Performed by: OBSTETRICS & GYNECOLOGY

## 2023-11-04 PROCEDURE — 86901 BLOOD TYPING SEROLOGIC RH(D): CPT | Performed by: OBSTETRICS & GYNECOLOGY

## 2023-11-04 RX ORDER — SODIUM CHLORIDE, SODIUM LACTATE, POTASSIUM CHLORIDE, CALCIUM CHLORIDE 600; 310; 30; 20 MG/100ML; MG/100ML; MG/100ML; MG/100ML
INJECTION, SOLUTION INTRAVENOUS CONTINUOUS PRN
Status: DISCONTINUED | OUTPATIENT
Start: 2023-11-04 | End: 2023-11-04 | Stop reason: SURG

## 2023-11-04 RX ORDER — BUPIVACAINE HYDROCHLORIDE 7.5 MG/ML
INJECTION, SOLUTION INTRASPINAL
Status: COMPLETED | OUTPATIENT
Start: 2023-11-04 | End: 2023-11-04

## 2023-11-04 RX ORDER — PHENYLEPHRINE HCL IN 0.9% NACL 1 MG/10 ML
SYRINGE (ML) INTRAVENOUS AS NEEDED
Status: DISCONTINUED | OUTPATIENT
Start: 2023-11-04 | End: 2023-11-04 | Stop reason: SURG

## 2023-11-04 RX ORDER — FENTANYL CITRATE 50 UG/ML
INJECTION, SOLUTION INTRAMUSCULAR; INTRAVENOUS
Status: COMPLETED | OUTPATIENT
Start: 2023-11-04 | End: 2023-11-04

## 2023-11-04 RX ORDER — OXYTOCIN/0.9 % SODIUM CHLORIDE 30/500 ML
PLASTIC BAG, INJECTION (ML) INTRAVENOUS AS NEEDED
Status: DISCONTINUED | OUTPATIENT
Start: 2023-11-04 | End: 2023-11-04 | Stop reason: SURG

## 2023-11-04 RX ORDER — SODIUM CHLORIDE 0.9 % (FLUSH) 0.9 %
10 SYRINGE (ML) INJECTION AS NEEDED
Status: DISCONTINUED | OUTPATIENT
Start: 2023-11-04 | End: 2023-11-05 | Stop reason: HOSPADM

## 2023-11-04 RX ORDER — LIDOCAINE HYDROCHLORIDE 10 MG/ML
0.5 INJECTION, SOLUTION EPIDURAL; INFILTRATION; INTRACAUDAL; PERINEURAL ONCE AS NEEDED
Status: DISCONTINUED | OUTPATIENT
Start: 2023-11-04 | End: 2023-11-05 | Stop reason: HOSPADM

## 2023-11-04 RX ORDER — SODIUM CHLORIDE, SODIUM LACTATE, POTASSIUM CHLORIDE, CALCIUM CHLORIDE 600; 310; 30; 20 MG/100ML; MG/100ML; MG/100ML; MG/100ML
125 INJECTION, SOLUTION INTRAVENOUS CONTINUOUS
Status: DISCONTINUED | OUTPATIENT
Start: 2023-11-04 | End: 2023-11-07 | Stop reason: HOSPADM

## 2023-11-04 RX ORDER — FAMOTIDINE 10 MG/ML
INJECTION, SOLUTION INTRAVENOUS AS NEEDED
Status: DISCONTINUED | OUTPATIENT
Start: 2023-11-04 | End: 2023-11-04 | Stop reason: SURG

## 2023-11-04 RX ORDER — ONDANSETRON 2 MG/ML
INJECTION INTRAMUSCULAR; INTRAVENOUS AS NEEDED
Status: DISCONTINUED | OUTPATIENT
Start: 2023-11-04 | End: 2023-11-04 | Stop reason: SURG

## 2023-11-04 RX ORDER — SODIUM CHLORIDE 9 MG/ML
40 INJECTION, SOLUTION INTRAVENOUS AS NEEDED
Status: DISCONTINUED | OUTPATIENT
Start: 2023-11-04 | End: 2023-11-05 | Stop reason: HOSPADM

## 2023-11-04 RX ORDER — SODIUM CHLORIDE 0.9 % (FLUSH) 0.9 %
10 SYRINGE (ML) INJECTION EVERY 12 HOURS SCHEDULED
Status: DISCONTINUED | OUTPATIENT
Start: 2023-11-04 | End: 2023-11-05 | Stop reason: HOSPADM

## 2023-11-04 RX ORDER — ACETAMINOPHEN 500 MG
1000 TABLET ORAL ONCE
Status: COMPLETED | OUTPATIENT
Start: 2023-11-04 | End: 2023-11-04

## 2023-11-04 RX ORDER — MORPHINE SULFATE 1 MG/ML
INJECTION, SOLUTION EPIDURAL; INTRATHECAL; INTRAVENOUS AS NEEDED
Status: DISCONTINUED | OUTPATIENT
Start: 2023-11-04 | End: 2023-11-04 | Stop reason: SURG

## 2023-11-04 RX ORDER — METOCLOPRAMIDE HYDROCHLORIDE 5 MG/ML
INJECTION INTRAMUSCULAR; INTRAVENOUS AS NEEDED
Status: DISCONTINUED | OUTPATIENT
Start: 2023-11-04 | End: 2023-11-04 | Stop reason: SURG

## 2023-11-04 RX ADMIN — Medication 300 MCG: at 23:21

## 2023-11-04 RX ADMIN — Medication 300 MCG: at 23:37

## 2023-11-04 RX ADMIN — FENTANYL CITRATE 25 MCG: 50 INJECTION, SOLUTION INTRAMUSCULAR; INTRAVENOUS at 23:16

## 2023-11-04 RX ADMIN — FAMOTIDINE 20 MG: 10 INJECTION INTRAVENOUS at 23:21

## 2023-11-04 RX ADMIN — SODIUM CHLORIDE, POTASSIUM CHLORIDE, SODIUM LACTATE AND CALCIUM CHLORIDE 1000 ML: 600; 310; 30; 20 INJECTION, SOLUTION INTRAVENOUS at 21:59

## 2023-11-04 RX ADMIN — MORPHINE SULFATE 0.3 MG: 1 INJECTION, SOLUTION EPIDURAL; INTRATHECAL; INTRAVENOUS at 23:17

## 2023-11-04 RX ADMIN — SODIUM CHLORIDE, POTASSIUM CHLORIDE, SODIUM LACTATE AND CALCIUM CHLORIDE: 600; 310; 30; 20 INJECTION, SOLUTION INTRAVENOUS at 22:57

## 2023-11-04 RX ADMIN — Medication 500 ML: at 23:27

## 2023-11-04 RX ADMIN — Medication 200 MCG: at 23:43

## 2023-11-04 RX ADMIN — SODIUM CHLORIDE 2000 MG: 900 INJECTION INTRAVENOUS at 22:53

## 2023-11-04 RX ADMIN — METOCLOPRAMIDE 10 MG: 5 INJECTION, SOLUTION INTRAMUSCULAR; INTRAVENOUS at 23:21

## 2023-11-04 RX ADMIN — BUPIVACAINE HYDROCHLORIDE IN DEXTROSE 1.8 ML: 7.5 INJECTION, SOLUTION SUBARACHNOID at 23:16

## 2023-11-04 RX ADMIN — ONDANSETRON 4 MG: 2 INJECTION INTRAMUSCULAR; INTRAVENOUS at 23:21

## 2023-11-04 RX ADMIN — ACETAMINOPHEN 1000 MG: 500 TABLET ORAL at 21:55

## 2023-11-05 ENCOUNTER — ANESTHESIA (OUTPATIENT)
Dept: LABOR AND DELIVERY | Facility: HOSPITAL | Age: 33
End: 2023-11-05
Payer: COMMERCIAL

## 2023-11-05 PROCEDURE — 25010000002 ONDANSETRON PER 1 MG: Performed by: OBSTETRICS & GYNECOLOGY

## 2023-11-05 PROCEDURE — 59510 CESAREAN DELIVERY: CPT | Performed by: OBSTETRICS & GYNECOLOGY

## 2023-11-05 PROCEDURE — 25010000002 KETOROLAC TROMETHAMINE PER 15 MG: Performed by: OBSTETRICS & GYNECOLOGY

## 2023-11-05 PROCEDURE — 59025 FETAL NON-STRESS TEST: CPT

## 2023-11-05 PROCEDURE — 63710000001 PROMETHAZINE PER 25 MG: Performed by: OBSTETRICS & GYNECOLOGY

## 2023-11-05 PROCEDURE — 51702 INSERT TEMP BLADDER CATH: CPT

## 2023-11-05 RX ORDER — METOCLOPRAMIDE 10 MG/1
10 TABLET ORAL ONCE AS NEEDED
Status: DISCONTINUED | OUTPATIENT
Start: 2023-11-05 | End: 2023-11-07 | Stop reason: HOSPADM

## 2023-11-05 RX ORDER — METHYLERGONOVINE MALEATE 0.2 MG/ML
200 INJECTION INTRAVENOUS ONCE AS NEEDED
Status: DISCONTINUED | OUTPATIENT
Start: 2023-11-05 | End: 2023-11-05 | Stop reason: HOSPADM

## 2023-11-05 RX ORDER — ACETAMINOPHEN 500 MG
1000 TABLET ORAL EVERY 6 HOURS
Status: COMPLETED | OUTPATIENT
Start: 2023-11-05 | End: 2023-11-05

## 2023-11-05 RX ORDER — ALUMINA, MAGNESIA, AND SIMETHICONE 2400; 2400; 240 MG/30ML; MG/30ML; MG/30ML
15 SUSPENSION ORAL EVERY 4 HOURS PRN
Status: DISCONTINUED | OUTPATIENT
Start: 2023-11-05 | End: 2023-11-07 | Stop reason: HOSPADM

## 2023-11-05 RX ORDER — CARBOPROST TROMETHAMINE 250 UG/ML
250 INJECTION, SOLUTION INTRAMUSCULAR AS NEEDED
Status: DISCONTINUED | OUTPATIENT
Start: 2023-11-05 | End: 2023-11-05 | Stop reason: HOSPADM

## 2023-11-05 RX ORDER — PROMETHAZINE HYDROCHLORIDE 25 MG/1
25 TABLET ORAL ONCE AS NEEDED
Status: COMPLETED | OUTPATIENT
Start: 2023-11-05 | End: 2023-11-05

## 2023-11-05 RX ORDER — OXYCODONE HYDROCHLORIDE 10 MG/1
10 TABLET ORAL EVERY 4 HOURS PRN
Status: DISCONTINUED | OUTPATIENT
Start: 2023-11-05 | End: 2023-11-07 | Stop reason: HOSPADM

## 2023-11-05 RX ORDER — OXYTOCIN/0.9 % SODIUM CHLORIDE 30/500 ML
125 PLASTIC BAG, INJECTION (ML) INTRAVENOUS CONTINUOUS PRN
Status: DISCONTINUED | OUTPATIENT
Start: 2023-11-05 | End: 2023-11-07 | Stop reason: HOSPADM

## 2023-11-05 RX ORDER — CALCIUM CARBONATE 500 MG/1
1 TABLET, CHEWABLE ORAL EVERY 4 HOURS PRN
Status: DISCONTINUED | OUTPATIENT
Start: 2023-11-05 | End: 2023-11-07 | Stop reason: HOSPADM

## 2023-11-05 RX ORDER — OXYCODONE HYDROCHLORIDE 5 MG/1
5 TABLET ORAL EVERY 4 HOURS PRN
Status: DISCONTINUED | OUTPATIENT
Start: 2023-11-05 | End: 2023-11-07 | Stop reason: HOSPADM

## 2023-11-05 RX ORDER — KETOROLAC TROMETHAMINE 15 MG/ML
15 INJECTION, SOLUTION INTRAMUSCULAR; INTRAVENOUS EVERY 6 HOURS
Status: COMPLETED | OUTPATIENT
Start: 2023-11-05 | End: 2023-11-06

## 2023-11-05 RX ORDER — PROMETHAZINE HYDROCHLORIDE 12.5 MG/1
12.5 SUPPOSITORY RECTAL ONCE AS NEEDED
Status: COMPLETED | OUTPATIENT
Start: 2023-11-05 | End: 2023-11-05

## 2023-11-05 RX ORDER — NICOTINE 21 MG/24HR
1 PATCH, TRANSDERMAL 24 HOURS TRANSDERMAL EVERY 24 HOURS
Status: DISCONTINUED | OUTPATIENT
Start: 2023-11-05 | End: 2023-11-07 | Stop reason: HOSPADM

## 2023-11-05 RX ORDER — ACETAMINOPHEN 325 MG/1
650 TABLET ORAL EVERY 6 HOURS
Status: DISCONTINUED | OUTPATIENT
Start: 2023-11-06 | End: 2023-11-07 | Stop reason: HOSPADM

## 2023-11-05 RX ORDER — HYDROCORTISONE 25 MG/G
1 CREAM TOPICAL AS NEEDED
Status: DISCONTINUED | OUTPATIENT
Start: 2023-11-05 | End: 2023-11-07 | Stop reason: HOSPADM

## 2023-11-05 RX ORDER — SCOLOPAMINE TRANSDERMAL SYSTEM 1 MG/1
1 PATCH, EXTENDED RELEASE TRANSDERMAL
Status: DISCONTINUED | OUTPATIENT
Start: 2023-11-05 | End: 2023-11-07 | Stop reason: HOSPADM

## 2023-11-05 RX ORDER — SIMETHICONE 80 MG
80 TABLET,CHEWABLE ORAL 4 TIMES DAILY PRN
Status: DISCONTINUED | OUTPATIENT
Start: 2023-11-05 | End: 2023-11-07 | Stop reason: HOSPADM

## 2023-11-05 RX ORDER — IBUPROFEN 600 MG/1
600 TABLET ORAL EVERY 6 HOURS
Status: DISCONTINUED | OUTPATIENT
Start: 2023-11-06 | End: 2023-11-07 | Stop reason: HOSPADM

## 2023-11-05 RX ORDER — KETOROLAC TROMETHAMINE 30 MG/ML
30 INJECTION, SOLUTION INTRAMUSCULAR; INTRAVENOUS ONCE
Status: COMPLETED | OUTPATIENT
Start: 2023-11-05 | End: 2023-11-05

## 2023-11-05 RX ORDER — OXYTOCIN/0.9 % SODIUM CHLORIDE 30/500 ML
250 PLASTIC BAG, INJECTION (ML) INTRAVENOUS CONTINUOUS
Status: ACTIVE | OUTPATIENT
Start: 2023-11-05 | End: 2023-11-05

## 2023-11-05 RX ORDER — MISOPROSTOL 200 UG/1
800 TABLET ORAL AS NEEDED
Status: DISCONTINUED | OUTPATIENT
Start: 2023-11-05 | End: 2023-11-05 | Stop reason: HOSPADM

## 2023-11-05 RX ORDER — ONDANSETRON 4 MG/1
4 TABLET, FILM COATED ORAL EVERY 6 HOURS PRN
Status: DISCONTINUED | OUTPATIENT
Start: 2023-11-05 | End: 2023-11-07 | Stop reason: HOSPADM

## 2023-11-05 RX ORDER — ONDANSETRON 2 MG/ML
4 INJECTION INTRAMUSCULAR; INTRAVENOUS EVERY 6 HOURS PRN
Status: DISCONTINUED | OUTPATIENT
Start: 2023-11-05 | End: 2023-11-07 | Stop reason: HOSPADM

## 2023-11-05 RX ORDER — PRENATAL VIT/IRON FUM/FOLIC AC 27MG-0.8MG
1 TABLET ORAL DAILY
Status: DISCONTINUED | OUTPATIENT
Start: 2023-11-05 | End: 2023-11-07 | Stop reason: HOSPADM

## 2023-11-05 RX ORDER — OXYTOCIN/0.9 % SODIUM CHLORIDE 30/500 ML
999 PLASTIC BAG, INJECTION (ML) INTRAVENOUS ONCE
Status: DISCONTINUED | OUTPATIENT
Start: 2023-11-05 | End: 2023-11-05 | Stop reason: HOSPADM

## 2023-11-05 RX ADMIN — KETOROLAC TROMETHAMINE 15 MG: 15 INJECTION, SOLUTION INTRAMUSCULAR; INTRAVENOUS at 18:12

## 2023-11-05 RX ADMIN — SIMETHICONE 80 MG: 80 TABLET, CHEWABLE ORAL at 16:31

## 2023-11-05 RX ADMIN — SIMETHICONE 80 MG: 80 TABLET, CHEWABLE ORAL at 21:51

## 2023-11-05 RX ADMIN — KETOROLAC TROMETHAMINE 15 MG: 15 INJECTION, SOLUTION INTRAMUSCULAR; INTRAVENOUS at 06:47

## 2023-11-05 RX ADMIN — KETOROLAC TROMETHAMINE 15 MG: 15 INJECTION, SOLUTION INTRAMUSCULAR; INTRAVENOUS at 13:20

## 2023-11-05 RX ADMIN — KETOROLAC TROMETHAMINE 30 MG: 30 INJECTION, SOLUTION INTRAMUSCULAR; INTRAVENOUS at 00:10

## 2023-11-05 RX ADMIN — ACETAMINOPHEN 1000 MG: 500 TABLET ORAL at 04:34

## 2023-11-05 RX ADMIN — ACETAMINOPHEN 1000 MG: 500 TABLET ORAL at 16:31

## 2023-11-05 RX ADMIN — PROMETHAZINE HYDROCHLORIDE 25 MG: 25 TABLET ORAL at 07:44

## 2023-11-05 RX ADMIN — ACETAMINOPHEN 1000 MG: 500 TABLET ORAL at 21:51

## 2023-11-05 RX ADMIN — ONDANSETRON 4 MG: 2 INJECTION INTRAMUSCULAR; INTRAVENOUS at 05:34

## 2023-11-05 RX ADMIN — SCOPOLAMINE 1 PATCH: 1.5 PATCH, EXTENDED RELEASE TRANSDERMAL at 08:20

## 2023-11-05 RX ADMIN — SERTRALINE 50 MG: 50 TABLET, FILM COATED ORAL at 13:20

## 2023-11-05 RX ADMIN — ACETAMINOPHEN 1000 MG: 500 TABLET ORAL at 10:20

## 2023-11-05 NOTE — ANESTHESIA POSTPROCEDURE EVALUATION
Patient: Kia Allan    Procedure Summary       Date: 23 Room / Location: [facility] LABOR DELIVERY   [facility] LABOR DELIVERY    Anesthesia Start:  Anesthesia Stop:     Procedure:  SECTION PRIMARY (Abdomen) Diagnosis:     Surgeons: Wayne Mattson MD Provider: Aden Ace MD    Anesthesia Type: spinal ASA Status: 2 - Emergent            Anesthesia Type: spinal    Vitals  No vitals data found for the desired time range.          Post Anesthesia Care and Evaluation    Patient location during evaluation: bedside  Patient participation: complete - patient participated  Level of consciousness: awake and alert  Pain score: 0  Pain management: adequate    Airway patency: patent  Anesthetic complications: No anesthetic complications    Cardiovascular status: acceptable  Respiratory status: acceptable  Hydration status: acceptable

## 2023-11-05 NOTE — OP NOTE
Section Procedure Note    Indications: malpresentation: Breech    Pre-operative Diagnosis: 38 week 0 day pregnancy.    Post-operative Diagnosis: same  Labor    Surgeon: Wayne Mattson MD     Assistants: 1st assist    Anesthesia: Spinal anesthesia    ASA Class: 2    Procedure: Primary  section, low transverse uterine incision    Procedure Details   The patient was seen in the Holding Room. The risks, benefits, complications, treatment options, and expected outcomes were discussed with the patient.  The patient concurred with the proposed plan, giving informed consent.  The site of surgery properly noted/marked. The patient was taken to the Operating Room, identified as Kia Allan and the procedure verified as  Delivery. A Time Out was held and the above information confirmed.    After induction of anesthesia, the patient was draped and prepped in the usual sterile manner. A Pfannenstiel incision was made and carried down through the subcutaneous tissue to the fascia. Fascial incision was made and extended transversely. The fascia was  from the underlying rectus tissue superiorly and inferiorly. The peritoneum was identified and entered. Peritoneal incision was extended longitudinally.  A low transverse uterine incision was made. Delivered from breech presentation was a boy with apgars scores of         APGARS  One minute Five minutes Ten minutes Fifteen minutes Twenty minutes   Skin color: 0   1             Heart rate: 2   2             Grimace: 2   2              Muscle tone: 2   2              Breathin   2              Totals: 8   9              . Cord milking performed and after the umbilical cord was clamped and cut cord blood was obtained for evaluation. The placenta was removed intact and appeared normal. The uterine outline, tubes and ovaries appeared normal. The uterine incision was closed with a running locked sutures of 1 monocryl, followed by an  imbricating stitch of 1 Monocryl. Hemostasis was observed and the uterus returned to the peritoneal cavity.  Pericolic gutters were irrigated and cleared of all clot and debris.  The rectus muscles were reapproximated with interrupted stitches of 2.0 vicryl. The fascia was then closed with running sutures of 0 Vicryl. The skin was reapproximated with 3-0 Monocryl followed by skin glue.     Instrument, sponge, and needle counts were correct prior the abdominal closure and at the conclusion of the case.     Findings:  Viable male infant    Estimated Blood Loss:   500ml           Drains: rinaldi           Total IV Fluids: 1200ml           Specimens: placenta                   Complications:  None; patient tolerated the procedure well.           Disposition: PACU - hemodynamically stable.           Condition: stable    Attending Attestation: I performed the procedure.

## 2023-11-05 NOTE — H&P
Kia Allan  1990  [id number]  [id number]    CC: contractions  HPI:  Patient is 33 y.o. female   currently at 38w0d presents with c/o uterine contractions, onset ~1900, intermittent, rates 4-10/10, no assoc bleeding or leaking.  Good FM.  Pt denies recent intercourse.  PNC comp by breech presentation.   Pt was sched for ECV trial next week.    PMH:  Current meds: PNV, inhaler (no use >18 months)  Illnesses: migraines, asthma  Surgeries: rt and lt knee arthroscopies, lt wrist surg X 2 (ganglion cysts), rt wrist   Surgery (fracture), oral surg  Allergies: sulfa- rash       Miralax- showed on allergy testing    Past OB History:       OB History    Para Term  AB Living   5 1 1 0 3 1   SAB IAB Ectopic Molar Multiple Live Births   1 1 1 0 0 1      # Outcome Date GA Lbr Yury/2nd Weight Sex Delivery Anes PTL Lv   5 Current            4 Term 22 39w4d 18:37 / 02:36 4090 g (9 lb 0.3 oz) M Vag-Spont EPI N LEO      Name: JENAE ALLAN      Apgar1: 8  Apgar5: 9   3 Ectopic            2 2015           1 IAB                     SH: tob neg , EtOH neg, drugs neg    General ROS: contractions, N, V.   All other systems reviewed and are negative.      Physical Examination: General appearance - alert, well appearing, and in no distress  Vital signs - /80 (BP Location: Left arm, Patient Position: Lying)   Pulse 96   Temp 97.8 °F (36.6 °C) (Oral)   Resp 14   LMP 2023 (Exact Date)   HEENT: normocephalic, atraumatic,oropharynx clear, appearance of ears and nose normal  Neck - supple, no significant adenopathy, no thyromegaly  Lymphatics - no palpable lymphadenopathy in the neck or groin, no hepatosplenomegaly  Chest - clear to auscultation, no wheezes, rales or rhonchi, respiratory effort non-labored  Heart - normal rate, regular rhythm, no murmurs, rubs, clicks or gallops, no JVD, no lower extremity edema  Abdomen - soft, nontender, nondistended, no masses, no  hepatosplenomegaly  no rebound tenderness noted, bowel sounds normal  Vaginal Exam: 3/60%/-2, no blood in vault,external genitalia normal  Extremities - no pedal edema noted, no calf tend  Skin -warm and dry, normal coloration and turgor, no rashes, no suspicious skin lesions noted      Fetal monitoring: indication contractions , onset  , offset  , baseline 135 , mod BTB variability , multiple accels (15 X 15), no decels, irreg contractions, interpretation reactive NST    Radiology US: maureen breech    Assessment 1)IUP 38 0/7 weeks   2)maureen breech   3)prob early labor    Plan 1)admit   2)    Santhosh Gordon MD  2023  21:22 EDT

## 2023-11-05 NOTE — ANESTHESIA POSTPROCEDURE EVALUATION
Patient: Kia Allan    Procedure Summary       Date: 23 Room / Location: [facility] LABOR DELIVERY   [facility] LABOR DELIVERY    Anesthesia Start:  Anesthesia Stop:     Procedure:  SECTION PRIMARY (Abdomen) Diagnosis:     Surgeons: Wayne Mattson MD Provider: Aden Ace MD    Anesthesia Type: spinal ASA Status: 2 - Emergent            Anesthesia Type: spinal    Vitals  Vitals Value Taken Time   BP 95/55 23 1142   Temp 98.4 °F (36.9 °C) 23 1142   Pulse 62 23 1142   Resp 16 23 1142   SpO2 95 % 23 0158   Vitals shown include unfiled device data.        Post Anesthesia Care and Evaluation    Patient location during evaluation: bedside  Patient participation: complete - patient participated  Level of consciousness: awake and awake and alert  Pain score: 0  Pain management: satisfactory to patient    Airway patency: patent  Anesthetic complications: No anesthetic complications  PONV Status: none  Cardiovascular status: acceptable, hemodynamically stable and stable  Respiratory status: acceptable  Hydration status: stable  Post Neuraxial Block status: Motor and sensory function returned to baseline and No signs or symptoms of PDPH

## 2023-11-05 NOTE — PROGRESS NOTES
2023    Name:Kia Allan    MR#:[id number]     PROGRESS NOTE:  Post-Op 1 S/P    HD:1    Subjective   33 y.o. yo Female  s/p CS at 38w0d doing well. Pain well controlled. Tolerating regular diet and having flatus. Lochia normal.   Hx of PPD.  She wants to start Zoloft now.      Breech presentation       Objective    Vitals  Temp:  Temp:  [97.3 °F (36.3 °C)-98.4 °F (36.9 °C)] 98.4 °F (36.9 °C)  Temp src: Oral  BP:  BP: ()/() 95/55  Pulse:  Heart Rate:  [51-96] 62  RR:   Resp:  [14-18] 16    General Awake, alert, no distress  Abdomen Soft, non-distended, fundus firm, below umbilicus, appropriately tender  Incision  Intact, no erythema or exudate  Extremities Calves NT bilaterally     I/O last 3 completed shifts:  In: 900 [I.V.:900]  Out: 1609 [Urine:900; Blood:709]    LABS:   Lab Results   Component Value Date    WBC 14.31 (H) 2023    HGB 11.4 (L) 2023    HCT 32.7 (L) 2023    MCV 84.3 2023     2023       Infant: male       Assessment   1.  POD 1, doing well; am labs pending   2.  Baby boy well; desires circ   3.  Hx PPD-- Zoloft worked well    Plan: Routine postoperative care.  Advance.  Start Zoloft.      Active Problems:   None      Erika Liu, APRN  2023 12:17 EST

## 2023-11-05 NOTE — ANESTHESIA PROCEDURE NOTES
Spinal Block      Patient reassessed immediately prior to procedure    Patient location during procedure: OR  Start Time: 11/4/2023 11:16 PM  Stop Time: 11/4/2023 11:16 PM  Indication:at surgeon's request  Performed By  Anesthesiologist: Aden Ace MD  Preanesthetic Checklist  Completed: patient identified, IV checked, site marked, risks and benefits discussed, surgical consent, monitors and equipment checked, pre-op evaluation and timeout performed  Spinal Block Prep:  Patient Position:sitting  Sterile Tech:cap, gloves, sterile barriers and mask  Prep:Chloraprep  Patient Monitoring:EKG, continuous pulse oximetry and blood pressure monitoring    Spinal Block Procedure  Approach:midline  Guidance:palpation technique  Needle Type:Benjamin  Needle Gauge:25 G  Placement of Spinal needle event:cerebrospinal fluid aspirated  Paresthesia: no  Fluid Appearance:clear  Medications: bupivacaine in dextrose (MARCAINE SPINAL / Hyberbaric) 0.75-8.25 % injection - Intrathecal   1.8 mL - 11/4/2023 11:16:00 PM  fentaNYL citrate (PF) (SUBLIMAZE) injection - Intrathecal   25 mcg - 11/4/2023 11:16:00 PM   Post Assessment  Patient Tolerance:patient tolerated the procedure well with no apparent complications  Complications no

## 2023-11-05 NOTE — ANESTHESIA PREPROCEDURE EVALUATION
Anesthesia Evaluation     Patient summary reviewed and Nursing notes reviewed                Airway   Mallampati: I  TM distance: >3 FB  Neck ROM: full  No difficulty expected  Dental - normal exam     Pulmonary - negative pulmonary ROS and normal exam   Cardiovascular - negative cardio ROS and normal exam        Neuro/Psych- negative ROS  GI/Hepatic/Renal/Endo    (+) GERD    Musculoskeletal (-) negative ROS    Abdominal  - normal exam    Bowel sounds: normal.   Substance History - negative use     OB/GYN    (+) Pregnant        Other                    Anesthesia Plan    ASA 2 - emergent     spinal       Anesthetic plan, risks, benefits, and alternatives have been provided, discussed and informed consent has been obtained with: patient.    Use of blood products discussed with patient .    Plan discussed with attending.    CODE STATUS:    Level Of Support Discussed With: Patient  Code Status (Patient has no pulse and is not breathing): CPR (Attempt to Resuscitate)  Medical Interventions (Patient has pulse or is breathing): Full Support

## 2023-11-06 LAB
BASOPHILS # BLD AUTO: 0.03 10*3/MM3 (ref 0–0.2)
BASOPHILS NFR BLD AUTO: 0.4 % (ref 0–1.5)
DEPRECATED RDW RBC AUTO: 41.7 FL (ref 37–54)
EOSINOPHIL # BLD AUTO: 0.07 10*3/MM3 (ref 0–0.4)
EOSINOPHIL NFR BLD AUTO: 0.9 % (ref 0.3–6.2)
ERYTHROCYTE [DISTWIDTH] IN BLOOD BY AUTOMATED COUNT: 13.3 % (ref 12.3–15.4)
HCT VFR BLD AUTO: 28.8 % (ref 34–46.6)
HGB BLD-MCNC: 9.5 G/DL (ref 12–15.9)
IMM GRANULOCYTES # BLD AUTO: 0.04 10*3/MM3 (ref 0–0.05)
IMM GRANULOCYTES NFR BLD AUTO: 0.5 % (ref 0–0.5)
LYMPHOCYTES # BLD AUTO: 1.78 10*3/MM3 (ref 0.7–3.1)
LYMPHOCYTES NFR BLD AUTO: 21.7 % (ref 19.6–45.3)
MCH RBC QN AUTO: 28.4 PG (ref 26.6–33)
MCHC RBC AUTO-ENTMCNC: 33 G/DL (ref 31.5–35.7)
MCV RBC AUTO: 86.2 FL (ref 79–97)
MONOCYTES # BLD AUTO: 0.58 10*3/MM3 (ref 0.1–0.9)
MONOCYTES NFR BLD AUTO: 7.1 % (ref 5–12)
NEUTROPHILS NFR BLD AUTO: 5.72 10*3/MM3 (ref 1.7–7)
NEUTROPHILS NFR BLD AUTO: 69.4 % (ref 42.7–76)
NRBC BLD AUTO-RTO: 0 /100 WBC (ref 0–0.2)
PLATELET # BLD AUTO: 183 10*3/MM3 (ref 140–450)
PMV BLD AUTO: 11 FL (ref 6–12)
RBC # BLD AUTO: 3.34 10*6/MM3 (ref 3.77–5.28)
WBC NRBC COR # BLD: 8.22 10*3/MM3 (ref 3.4–10.8)

## 2023-11-06 PROCEDURE — 25010000002 KETOROLAC TROMETHAMINE PER 15 MG: Performed by: OBSTETRICS & GYNECOLOGY

## 2023-11-06 PROCEDURE — 0503F POSTPARTUM CARE VISIT: CPT | Performed by: ADVANCED PRACTICE MIDWIFE

## 2023-11-06 PROCEDURE — 85025 COMPLETE CBC W/AUTO DIFF WBC: CPT | Performed by: OBSTETRICS & GYNECOLOGY

## 2023-11-06 RX ORDER — FERROUS SULFATE 325(65) MG
325 TABLET ORAL
Status: DISCONTINUED | OUTPATIENT
Start: 2023-11-06 | End: 2023-11-07 | Stop reason: HOSPADM

## 2023-11-06 RX ADMIN — IBUPROFEN 600 MG: 600 TABLET, FILM COATED ORAL at 12:37

## 2023-11-06 RX ADMIN — IBUPROFEN 600 MG: 600 TABLET, FILM COATED ORAL at 06:35

## 2023-11-06 RX ADMIN — ACETAMINOPHEN 650 MG: 325 TABLET ORAL at 22:08

## 2023-11-06 RX ADMIN — PRENATAL VITAMINS-IRON FUMARATE 27 MG IRON-FOLIC ACID 0.8 MG TABLET 1 TABLET: at 09:17

## 2023-11-06 RX ADMIN — IBUPROFEN 600 MG: 600 TABLET, FILM COATED ORAL at 18:54

## 2023-11-06 RX ADMIN — OXYCODONE HYDROCHLORIDE 5 MG: 5 TABLET ORAL at 18:08

## 2023-11-06 RX ADMIN — FERROUS SULFATE TAB 325 MG (65 MG ELEMENTAL FE) 325 MG: 325 (65 FE) TAB at 10:24

## 2023-11-06 RX ADMIN — SERTRALINE 50 MG: 50 TABLET, FILM COATED ORAL at 09:16

## 2023-11-06 RX ADMIN — SIMETHICONE 80 MG: 80 TABLET, CHEWABLE ORAL at 09:16

## 2023-11-06 RX ADMIN — ACETAMINOPHEN 650 MG: 325 TABLET ORAL at 16:19

## 2023-11-06 RX ADMIN — KETOROLAC TROMETHAMINE 15 MG: 15 INJECTION, SOLUTION INTRAMUSCULAR; INTRAVENOUS at 00:54

## 2023-11-06 RX ADMIN — ACETAMINOPHEN 650 MG: 325 TABLET ORAL at 04:03

## 2023-11-06 RX ADMIN — SIMETHICONE 80 MG: 80 TABLET, CHEWABLE ORAL at 16:19

## 2023-11-06 RX ADMIN — ACETAMINOPHEN 650 MG: 325 TABLET ORAL at 09:22

## 2023-11-06 NOTE — PROGRESS NOTES
Postpartum Progress Note    Patient name: Kia Allan  YOB: 1990   MRN: [id number]  Referring Provider: Cheryle Kaplan*  Admission Date: 2023  Date of Service: 2023    ID: 33 y.o.     Diagnosis:   S/p  delivery 2 Days Post-Op     Breech presentation       Subjective:      No complaints.  Moderate lochia.  Ambulating, voiding, tolerating diet.  Pain well controlled.  The patient is currently breastfeeding.   This baby is a Boy and she does desire circumcision.   She is not passing flatus yet. States she is a little lightheaded when getting up so is waiting for her partner to get back to help her get up and shower.     Objective:      Vital signs:  Vital Signs Range for the last 24 hours  Temperature: Temp:  [97.8 °F (36.6 °C)-98.5 °F (36.9 °C)] 97.8 °F (36.6 °C)   Temp Source: Temp src: Oral   BP: BP: ()/(55-74) 120/74   Pulse: Heart Rate:  [62-88] 76   Respirations: Resp:  [16] 16   Weight: 92.1 kg (203 lb)     General: Alert & oriented x4, in no apparent distress  Abdomen: soft, nontender  Uterus: firm, nontender  Incision: clean, dry, intact, dressing clean,  glue/sutures  Extremities: nontender; no edema      Labs:  Lab Results   Component Value Date    WBC 8.22 2023    HGB 9.5 (L) 2023    HCT 28.8 (L) 2023    MCV 86.2 2023     2023     Results from last 7 days   Lab Units 23   ABO TYPING  A   RH TYPING  Positive     External Prenatal Results       Pregnancy Outside Results - Transcribed From Office Records - See Scanned Records For Details       Test Value Date Time    ABO  A  23    Rh  Positive  23    Antibody Screen  Negative  23       Negative  23 1411       Negative  23 0954    Varicella IgG       Rubella  3.61 index 23 0954    Hgb  9.5 g/dL 23 0616       11.4 g/dL 23       11.1 g/dL 23 1411       13.7 g/dL 07/10/23 0527        13.0 g/dL 23 0954    Hct  28.8 % 23 0616       32.7 % 23 2158       33.7 % 23 1411       40.4 % 07/10/23 0527       38.9 % 23 0954    Glucose Fasting GTT  63 mg/dL 23 1037    Glucose Tolerance Test 1 hour  102 mg/dL 23 1037    Glucose Tolerance Test 3 hour  75 mg/dL 23 1037    Gonorrhea (discrete)  Negative  23 0954    Chlamydia (discrete)  Negative  23 0954    RPR  Non Reactive  23 0954    VDRL       Syphilis Antibody       HBsAg  Negative  23 0954    Herpes Simplex Virus PCR       Herpes Simplex VIrus Culture       HIV  Non Reactive  23 0954    Hep C RNA Quant PCR       Hep C Antibody  Non Reactive  23 0954    AFP       Group B Strep  Streptococcus agalactiae (Group B)  10/24/23 1832    GBS Susceptibility to Clindamycin       GBS Susceptibility to Erythromycin       Fetal Fibronectin       Genetic Testing, Maternal Blood                 Drug Screening       Test Value Date Time    Urine Drug Screen       Amphetamine Screen  Negative ng/mL 23 0954    Barbiturate Screen  Negative ng/mL 23 0954    Benzodiazepine Screen  Negative ng/mL 23 0954    Methadone Screen  Negative ng/mL 23 0954    Phencyclidine Screen  Negative ng/mL 23 0954    Opiates Screen       THC Screen       Cocaine Screen       Propoxyphene Screen  Negative ng/mL 23 0954    Buprenorphine Screen       Methamphetamine Screen       Oxycodone Screen       Tricyclic Antidepressants Screen                 Legend    ^: Historical                            Assessment/Plan:      2 Days Post-Op s/p Procedure(s):   SECTION PRIMARY  1. S/p  delivery: Continue postoperative care.  Doing well.  2. Infant feeding: Supportive care.  The patient is currently breastfeeding.  3. Iron deficiency anemia: Will start oral iron today.   Patient did desire early discharge, but she delivered close to midnight  so is not a candidate.  Encouraged ambulation.

## 2023-11-06 NOTE — LACTATION NOTE
23 1320   Maternal Information   Date of Referral 23   Person Making Referral lactation consultant  (newly postpartum)   Maternal Reason for Referral breastfeeding currently  (1st child breastfeed successfully for 6 months)   Infant Reason for Referral  infant  (infant was in the nursery at the time of visit.  Encouraged mother to call for a latch check at the next feeding.)   Maternal Assessment   Breast Size Issue none   Breast Shape Bilateral:;round   Breast Density Bilateral:;soft   Nipples Bilateral:;short  (mother reported that infant was breastfeeding well)   Left Nipple Symptoms intact;nontender   Right Nipple Symptoms intact;nontender   Maternal Infant Feeding   Maternal Emotional State receptive   Milk Expression/Equipment   Breast Pump Type double electric, personal  (Jenny and Spectra)   Breast Pump Flange Type hard   Breast Pumping   Breast Pumping Interventions other (see comments)  (can pump for short or missed feeds)     Courtesy visit with breastfeeding mother.  Encouraged PRN lactation as needed and discussed the outpatient lactation clinic for any needs after discharge.  Went over basic breastfeeding information and provided written materials with a QR code to  and breastpump QR codes.  Encouraged mother to look at the hospital booklet starting on page 35 for breastfeeding information. Encouraged attempting to breastfeed every 3 hours or more often with feeding cues, using stimulation to encourage high quality milk transfer.

## 2023-11-07 VITALS
HEIGHT: 70 IN | TEMPERATURE: 97.9 F | RESPIRATION RATE: 16 BRPM | OXYGEN SATURATION: 99 % | BODY MASS INDEX: 29.06 KG/M2 | SYSTOLIC BLOOD PRESSURE: 116 MMHG | DIASTOLIC BLOOD PRESSURE: 69 MMHG | HEART RATE: 75 BPM | WEIGHT: 203 LBS

## 2023-11-07 PROCEDURE — 0503F POSTPARTUM CARE VISIT: CPT

## 2023-11-07 RX ORDER — OXYCODONE HYDROCHLORIDE 5 MG/1
5 TABLET ORAL EVERY 6 HOURS PRN
Qty: 12 TABLET | Refills: 0 | Status: SHIPPED | OUTPATIENT
Start: 2023-11-07

## 2023-11-07 RX ORDER — IBUPROFEN 600 MG/1
600 TABLET ORAL EVERY 6 HOURS
Qty: 30 TABLET | Refills: 0 | Status: SHIPPED | OUTPATIENT
Start: 2023-11-07

## 2023-11-07 RX ADMIN — ACETAMINOPHEN 650 MG: 325 TABLET ORAL at 04:39

## 2023-11-07 RX ADMIN — ACETAMINOPHEN 650 MG: 325 TABLET ORAL at 10:30

## 2023-11-07 RX ADMIN — PRENATAL VITAMINS-IRON FUMARATE 27 MG IRON-FOLIC ACID 0.8 MG TABLET 1 TABLET: at 08:14

## 2023-11-07 RX ADMIN — FERROUS SULFATE TAB 325 MG (65 MG ELEMENTAL FE) 325 MG: 325 (65 FE) TAB at 08:14

## 2023-11-07 RX ADMIN — IBUPROFEN 600 MG: 600 TABLET, FILM COATED ORAL at 00:33

## 2023-11-07 RX ADMIN — SERTRALINE 50 MG: 50 TABLET, FILM COATED ORAL at 08:14

## 2023-11-07 RX ADMIN — IBUPROFEN 600 MG: 600 TABLET, FILM COATED ORAL at 06:50

## 2023-11-07 NOTE — DISCHARGE SUMMARY
Discharge Summary    Date of Admission: 2023  Date of Discharge:  2023      Patient: Kia Allan      MR#:[id number]    Primary Surgeon/OB: Wayne Mattson MD    Discharge Surgeon/OB: Eusebio    Presenting Problem/History of Present Illness  Breech presentation [O32.1XX0]       Breech presentation         Discharge Diagnosis:  section at 38w0d    Procedures:  , Low Transverse     2023    11:26 PM          Discharge Date: 2023; Discharge Time: 10:44 EST    Early Discharge:  NO    Hospital Course  Patient is a 33 y.o. female  at 38w0d status post  section with uneventful postoperative recovery.  Patient was advanced to regular diet on postoperative day#1. Contact dermatitis noted on lower abdomen sparing  incision. She will begin using benadryl cream. Two week f/u scheduled for incision check. Will reassess abdominal rash at that time. Patient reported lochia as mild and pain managed. On discharge, ambulating, tolerating a regular diet without any difficulties and her incision is dry, clean and intact.    Infant:   male  fetus 2746 g (6 lb 0.9 oz)  with Apgar scores of 8 , 9  at five minutes.    Condition on Discharge:  Stable    Vital Signs  Temp:  [97.9 °F (36.6 °C)-98.7 °F (37.1 °C)] 97.9 °F (36.6 °C)  Heart Rate:  [68-81] 75  Resp:  [16] 16  BP: (112-131)/(58-78) 116/69    Lab Results   Component Value Date    WBC 8.22 2023    HGB 9.5 (L) 2023    HCT 28.8 (L) 2023    MCV 86.2 2023     2023       Discharge Disposition  Home or Self Care    Discharge Medications     Discharge Medications        New Medications        Instructions Start Date   diphenhydrAMINE 2 % cream  Commonly known as: BENADRYL   1 application , Topical, 3 Times Daily PRN      ibuprofen 600 MG tablet  Commonly known as: ADVIL,MOTRIN   600 mg, Oral, Every 6 Hours      oxyCODONE 5 MG immediate release tablet  Commonly known as:  ROXICODONE   5 mg, Oral, Every 6 Hours PRN      sertraline 50 MG tablet  Commonly known as: Zoloft   50 mg, Oral, Daily             Continue These Medications        Instructions Start Date   prenatal vitamin 27-0.8 27-0.8 MG tablet tablet   Oral, Daily             Stop These Medications      albuterol sulfate  (90 Base) MCG/ACT inhaler  Commonly known as: PROVENTIL HFA;VENTOLIN HFA;PROAIR HFA     EPINEPHrine 0.3 MG/0.3ML solution auto-injector injection  Commonly known as: EPIPEN     fluticasone 50 MCG/ACT nasal spray  Commonly known as: FLONASE     hydrOXYzine pamoate 25 MG capsule  Commonly known as: Vistaril     promethazine-dextromethorphan 6.25-15 MG/5ML syrup  Commonly known as: PROMETHAZINE-DM                Activity at Discharge:   Activity Instructions       Driving Restrictions      Type of Restriction: Driving    Driving Restrictions: No Driving (Time Limited)    Length: 2 Weeks    Lifting Restrictions      Type of Restriction: Lifting    Lifting Restrictions: Lifting Restriction (Indicate Limit)    Weight Limit (Pounds): 10    Length of Lifting Restriction: 6 weeks    Pelvic Rest      No Sex, Tampons, Swimming, Tub Baths x 6 weeks            Follow-up Appointments  No future appointments.  Additional Instructions for the Follow-ups that You Need to Schedule       Call MD With Problems / Concerns   As directed      Instructions: Instructions: Call for symptoms related to depression, foul smelling discharge, fever >/=100.4F, blood clots bigger than a golf ball, saturating through >/= 1 pad per hour, discharge from incision.    Order Comments: Instructions: Instructions: Call for symptoms related to depression, foul smelling discharge, fever >/=100.4F, blood clots bigger than a golf ball, saturating through >/= 1 pad per hour, discharge from incision.         Discharge Follow-up with Specified Provider: Eusebio; 2 Weeks   As directed      To: Eusebio   Follow Up: 2 Weeks   Follow Up Details:  Incision check                Lillian Zuñiga, APRN  11/07/23  10:44 EST  Csd

## 2023-11-07 NOTE — PROGRESS NOTES
Nutrition Education for Breastfeeding    Patient Name: Kia Allan  MRN: [id number]  Admission date: 11/4/2023    Education date: 11/07/23 11:27 EST    Reason for visit: Nutrition and Breastfeeding education    Topics Covered During Education:  I educated on nutrition and breastfeeding.  Written information was given and I went over the information.  I emphasized ensuring that she is taking in enough calories per day, up to 250-400 extra calories per day while breastfeeding.  I also went over staying hydrated and drinking non-caffeinated fluids.  I also went over following a balanced diet with fruits, vegetables, whole grains, and some source of protein.  No questions during visit.    Written material given.      Sonia Tucker, CHERI  11:27 EST  Time Spent:  25 minutes

## 2023-11-07 NOTE — CASE MANAGEMENT/SOCIAL WORK
Continued Stay Note  [facility]     Patient Name: Kia Allan  MRN: [id number]  Today's Date: 11/7/2023    Admit Date: 11/4/2023    Plan: MSW available   Discharge Plan       Row Name 11/07/23 1015       Plan    Plan MSW available    Plan Comments Visited pt. Discussed risk for PPD. Pt report h/o depression and PPD. States she started on zoloft over the weekend. Denies any SI/ HI. States she does have intrusive thoughts at times. Provided listing of possible mental health services and encouraged her to call her insurance customer service to see what agencies are in network. Provided written info on PPD.    Final Discharge Disposition Code 01 - home or self-care                   Discharge Codes    No documentation.                 Expected Discharge Date and Time       Expected Discharge Date Expected Discharge Time    Nov 7, 2023               LIBRADO Mathur

## 2023-11-07 NOTE — PLAN OF CARE
Goal Outcome Evaluation:  Plan of Care Reviewed With: patient        Progress: improving  Outcome Evaluation: VSS. Lochia light. Ambulatory. Voiding spontaneously. Breastfeeding infant. Consulted with social work regarding [place] score of 20. OK to d/c home per SW. Awaiting discharge.

## 2023-11-17 ENCOUNTER — POSTPARTUM VISIT (OUTPATIENT)
Dept: OBSTETRICS AND GYNECOLOGY | Facility: CLINIC | Age: 33
End: 2023-11-17
Payer: COMMERCIAL

## 2023-11-17 VITALS
DIASTOLIC BLOOD PRESSURE: 78 MMHG | SYSTOLIC BLOOD PRESSURE: 112 MMHG | WEIGHT: 182 LBS | HEIGHT: 70 IN | BODY MASS INDEX: 26.05 KG/M2

## 2023-11-17 DIAGNOSIS — Z30.09 ENCOUNTER FOR OTHER GENERAL COUNSELING OR ADVICE ON CONTRACEPTION: ICD-10-CM

## 2023-11-17 DIAGNOSIS — F32.A DEPRESSION, UNSPECIFIED DEPRESSION TYPE: ICD-10-CM

## 2023-11-17 PROBLEM — Z34.82 PRENATAL CARE, SUBSEQUENT PREGNANCY IN SECOND TRIMESTER: Status: RESOLVED | Noted: 2023-07-17 | Resolved: 2023-11-17

## 2023-11-17 PROBLEM — N76.0 ACUTE VAGINITIS: Status: RESOLVED | Noted: 2023-07-17 | Resolved: 2023-11-17

## 2023-11-17 PROBLEM — Z91.410 HISTORY OF SEXUAL ABUSE IN ADULTHOOD: Status: ACTIVE | Noted: 2023-11-17

## 2023-11-17 PROBLEM — Z34.93 THIRD TRIMESTER PREGNANCY: Status: RESOLVED | Noted: 2023-10-08 | Resolved: 2023-11-17

## 2023-11-17 PROBLEM — Z34.83 MULTIGRAVIDA IN THIRD TRIMESTER: Status: RESOLVED | Noted: 2023-10-10 | Resolved: 2023-11-17

## 2023-11-17 NOTE — PROGRESS NOTES
"      Chief Complaint   Patient presents with    Postpartum Care     2 week       Postpartum Visit         Kia Allan is a 33 y.o.  who presents today for a 2 week(s) postpartum check.        , Low Transverse    Information for the patient's :  Mariana Domínguez [[id number]]   2023   male   Mariana Domínguez   2746 g (6 lb 0.9 oz)   Gestational Age: 38w0d    Baby Discharged with Mom  Delivering MD: Wayne Mattson MD.    Pregnancy complications: no known issues    Patient reports her incision is clean, dry, intact. Patient describes vaginal bleeding as moderate.  She is breast feeding. Patient denies bowel or bladder issues.    She desires contraceptive methods: Abstinence for contraception.    Patient denies postpartum depression. Postpartum Depression Screening Questionnaire: 11, already being treated with zoloft, started in hospital. Patient agrees it is working well. Currently taking Zoloft.       The additional following portions of the patient's history were reviewed and updated as appropriate: allergies, current medications, past family history, past medical history, past social history, past surgical history, and problem list.      Review of Systems   Genitourinary:  Positive for vaginal bleeding.   Psychiatric/Behavioral:  Negative for sleep disturbance, suicidal ideas and depressed mood. The patient is not nervous/anxious.    All other systems reviewed and are negative.      I have reviewed and agree with the HPI, ROS, and historical information as entered above. Margy Lees, APRN      Objective   /78   Ht 177.8 cm (70\")   Wt 82.6 kg (182 lb)   LMP 2023 (Exact Date)   Breastfeeding Yes   BMI 26.11 kg/m²     Physical Exam  Vitals and nursing note reviewed. Exam conducted with a chaperone present.   Constitutional:       Appearance: She is well-developed.   HENT:      Head: Normocephalic and atraumatic.   Neck:      Thyroid: No thyroid mass or thyromegaly. "   Pulmonary:      Breath sounds: No rhonchi.   Abdominal:      General: A surgical scar is present.      Palpations: Abdomen is soft. Abdomen is not rigid. There is no mass.      Tenderness: There is no abdominal tenderness. There is no guarding.      Hernia: No hernia is present.          Comments: Incision C/D/I, glue starting to peel away from skin.   Genitourinary:     Vagina: Normal.      Cervix: Normal.      Uterus: Normal.    Musculoskeletal:      Cervical back: Normal range of motion. No muscular tenderness.   Neurological:      Mental Status: She is alert and oriented to person, place, and time.   Psychiatric:         Behavior: Behavior normal.              Assessment and Plan    Problem List Items Addressed This Visit          Mental Health    Depression    Overview     Started prior to delivery-started zoloft in hospital. Maintaining well on it.          Relevant Medications    sertraline (Zoloft) 50 MG tablet     Other Visit Diagnoses       Postpartum care and examination    -  Primary    Encounter for other general counseling or advice on contraception                S/p , 2 week(s) postpartum.  Doing well.    Continued pelvic rest with a return to driving and light physical activity.  Baby doing well.  Breastfeeding going well.  No si/sx of postpartum depression  Contraception: contraceptive methods: Abstinence  Follow up in four weeks with ParaGard placement.    Margy Lees, APRN  2023

## 2023-11-27 ENCOUNTER — TELEPHONE (OUTPATIENT)
Dept: OBSTETRICS AND GYNECOLOGY | Facility: CLINIC | Age: 33
End: 2023-11-27
Payer: COMMERCIAL

## 2023-11-27 ENCOUNTER — POSTPARTUM VISIT (OUTPATIENT)
Dept: OBSTETRICS AND GYNECOLOGY | Facility: CLINIC | Age: 33
End: 2023-11-27
Payer: COMMERCIAL

## 2023-11-27 VITALS — DIASTOLIC BLOOD PRESSURE: 72 MMHG | SYSTOLIC BLOOD PRESSURE: 110 MMHG

## 2023-11-27 DIAGNOSIS — T81.49XA WOUND INFECTION AFTER SURGERY: Primary | ICD-10-CM

## 2023-11-27 DIAGNOSIS — Z01.419 ROUTINE GYNECOLOGICAL EXAMINATION: Primary | ICD-10-CM

## 2023-11-27 PROCEDURE — 0503F POSTPARTUM CARE VISIT: CPT | Performed by: OBSTETRICS & GYNECOLOGY

## 2023-11-27 RX ORDER — CEPHALEXIN 500 MG/1
500 CAPSULE ORAL 4 TIMES DAILY
Qty: 40 CAPSULE | Refills: 0 | Status: SHIPPED | OUTPATIENT
Start: 2023-11-27 | End: 2023-12-07

## 2023-11-27 NOTE — TELEPHONE ENCOUNTER
Provider: Cheryle Kaplan MD    Caller: MITZI AKINS    Relationship to Patient: SELF    Pharmacy:     Phone Number: [phone]    Reason for Call: DRAINAGE FROM INCISION - GREEN    When was the patient last seen: 11.17.23    When did it start: 11.24.23    Where is it located: LOWER STOMACH    Characteristics of symptom/severity: REDNESS, HEAT, GREEN PUSS    Timing- Is it constant or intermittent: CONSTANT    What makes it worse: NONE    What makes it better: NONE    What therapies/medications have you tried: BEEN KEEPING CLEAN, STERILE DRESSINGS    PATIENT CAN BE REACHED .[phone]    THANK YOU

## 2023-11-27 NOTE — PROGRESS NOTES
Chief Complaint   Patient presents with    Postpartum Care     Incision check       Postpartum Visit         Kia Allan is a 33 y.o.  who presents today for an incision check. She states her incision opened on Friday and has been draining a mixture of green discharge and blood. There is redness and warmth around the site. She denies fever.     , Low Transverse    Information for the patient's :  Mariana Domínguez [[id number]]   2023   male   Ilyatsangeeta Will   2746 g (6 lb 0.9 oz)   Gestational Age: 38w0d        Baby Discharged: Discharged with Mom  Delivering Physician: Wayne Mattson MD    At the time of delivery were you diagnosed with any of the following: None. The incision  is draining and red and painful  Patient describes vaginal bleeding as moderate.  Patient is breast feeding.  She desires contraceptive methods: Vasectomy  for contraception.  Patient denies bowel or bladder issues.      Patient reports postpartum depression. She is already in Zoloft and feels like its working well.    Last Completed Pap Smear            PAP SMEAR (Every 3 Years) Next due on 2021  SCANNED - PAP SMEAR    2018  Done - Negative, GC/Chl Negative, Yeast +                      The additional following portions of the patient's history were reviewed and updated as appropriate: allergies, current medications, past family history, past medical history, past social history, past surgical history, and problem list.    Review of Systems   All other systems reviewed and are negative.    All other systems reviewed and are negative.     I have reviewed and agree with the HPI, ROS, and historical information as entered above. Cheryle Kaplan MD      /72   LMP 2023 (Exact Date)   Breastfeeding Yes     Physical Exam  Physical Exam:  General:  well developed; well nourished  no acute distress   Abdomen: soft, non-tender; no masses  no umbilical or inguinal hernias  are present  no hepato-splenomegaly  incision is open ~1cm, erythematous, indurated, and tender on nearly entire left aspect.  No distinct fluctuant area.   Pelvis: Not performed.          Assessment and Plan    Problem List Items Addressed This Visit       Wound infection after surgery - Primary    Relevant Medications    cephalexin (Keflex) 500 MG capsule    Other Relevant Orders    Culture, Routine - Swab, Abdominal Wall       S/p , 3 week(s) postpartum with early wound infection    Culture done  Keflex.  Allergy to Bactrim.  Would need clindamycin if MRSA.  Return for Next scheduled follow up.     Cheryle Kaplan MD  2023

## 2023-12-01 LAB
BACTERIA SPEC CULT: ABNORMAL
BACTERIA SPEC CULT: ABNORMAL
MICROORGANISM/AGENT SPEC: ABNORMAL
OTHER ANTIBIOTIC SUSC ISLT: ABNORMAL

## 2023-12-13 ENCOUNTER — TELEPHONE (OUTPATIENT)
Dept: OBSTETRICS AND GYNECOLOGY | Facility: CLINIC | Age: 33
End: 2023-12-13

## 2023-12-14 ENCOUNTER — POSTPARTUM VISIT (OUTPATIENT)
Dept: OBSTETRICS AND GYNECOLOGY | Facility: CLINIC | Age: 33
End: 2023-12-14
Payer: COMMERCIAL

## 2023-12-14 VITALS
DIASTOLIC BLOOD PRESSURE: 82 MMHG | HEIGHT: 70 IN | BODY MASS INDEX: 26.34 KG/M2 | WEIGHT: 184 LBS | SYSTOLIC BLOOD PRESSURE: 122 MMHG

## 2023-12-14 DIAGNOSIS — Z30.430 ENCOUNTER FOR IUD INSERTION: Primary | ICD-10-CM

## 2023-12-14 DIAGNOSIS — Z48.89 ENCOUNTER FOR POST SURGICAL WOUND CHECK: ICD-10-CM

## 2023-12-14 RX ORDER — FERROUS SULFATE 324(65)MG
324 TABLET, DELAYED RELEASE (ENTERIC COATED) ORAL
COMMUNITY

## 2023-12-14 RX ORDER — COPPER 313.4 MG/1
INTRAUTERINE DEVICE INTRAUTERINE ONCE
Status: COMPLETED | OUTPATIENT
Start: 2023-12-14 | End: 2023-12-14

## 2023-12-14 RX ADMIN — COPPER: 313.4 INTRAUTERINE DEVICE INTRAUTERINE at 10:00

## 2023-12-14 NOTE — PROGRESS NOTES
Chief Complaint   Patient presents with    Postpartum Care       Postpartum Visit         iKa Allan is a 33 y.o.  who presents today for a 6 week(s) postpartum check.     C/S: breech     , Low Transverse    Information for the patient's :  Mariana Domínguez [[id number]]   2023   male   Mariana Domínguez   2746 g (6 lb 0.9 oz)   Gestational Age: 38w0d        Baby Discharged: Discharged with Mom  Delivering Physician: Wayne Mattson MD    At the time of delivery were you diagnosed with any of the following: None. The incision  draining blood, red, painful, and opened.  Patient describes vaginal bleeding as absent.  Patient is breast feeding and formula.  She desires contraceptive methods: IUD  for contraception.  Patient denies bowel or bladder issues.      Patient reports postpartum depression. Postpartum Depression Screening Questionnaire: 10,  treatment indicated. She states that there is something wrong with Zoloft prescription and [facility] wont fill it.       Last Pap : 21. Results: negative. HPV: not done.   Last Completed Pap Smear            PAP SMEAR (Every 3 Years) Next due on 2021  SCANNED - PAP SMEAR    2018  Done - Negative, GC/Chl Negative, Yeast +                      The additional following portions of the patient's history were reviewed and updated as appropriate: allergies, current medications, past family history, past medical history, past social history, past surgical history, and problem list.    Review of Systems   Constitutional: Negative.    HENT: Negative.     Eyes: Negative.    Respiratory: Negative.     Cardiovascular: Negative.    Gastrointestinal: Negative.    Endocrine: Negative.    Genitourinary: Negative.    Musculoskeletal: Negative.    Skin:  Positive for wound.   Allergic/Immunologic: Negative.    Neurological: Negative.    Hematological: Negative.    Psychiatric/Behavioral:  Positive for depressed mood. The  "patient is nervous/anxious.      All other systems reviewed and are negative.     I have reviewed and agree with the HPI, ROS, and historical information as entered above. iDane Hariston, APRN      /82 (BP Location: Right arm, Patient Position: Sitting, Cuff Size: Adult)   Ht 177.8 cm (70\")   Wt 83.5 kg (184 lb)   LMP 02/11/2023 (Exact Date)   Breastfeeding Yes   BMI 26.40 kg/m²     Physical Exam  Vitals and nursing note reviewed. Exam conducted with a chaperone present.   Constitutional:       Appearance: Normal appearance.   Abdominal:      General: A surgical scar is present.      Palpations: Abdomen is soft.      Comments: LTI with .5 cm open area at the left outer aspect of incision. Draining small amount purulent drainage. No odor. No tracking beneath incision. No redness, warmth or swelling around incision.  Incision cleaned with 1/2 peroxide solution and antibiotic ointment applied with light gauze dressing.   Genitourinary:     General: Normal vulva.      Exam position: Lithotomy position.      Labia:         Right: No rash, tenderness or lesion.         Left: No rash, tenderness or lesion.       Vagina: Normal. No lesions.      Cervix: Normal. No cervical motion tenderness, discharge, lesion or cervical bleeding.      Uterus: Normal. Not enlarged, not fixed and not tender.       Adnexa: Right adnexa normal and left adnexa normal.        Right: No mass or tenderness.          Left: No mass or tenderness.        Rectum: Normal. No external hemorrhoid.      Comments: Chaperone Present  Neurological:      Mental Status: She is alert.             Assessment and Plan    Problem List Items Addressed This Visit       Depression    Overview     Started prior to delivery-started zoloft in hospital. Maintaining well on it.          Relevant Medications    sertraline (Zoloft) 50 MG tablet     Other Visit Diagnoses       Encounter for IUD insertion    -  Primary    Relevant Medications    Paragard " "Intrauterine Copper IUD    Other Relevant Orders    POC Pregnancy, Urine (Completed)    US Non-ob Transvaginal    Postpartum follow-up        Encounter for post surgical wound check                S/p , 6 week(s) postpartum.  Doing well.    Continue to clean incision twice daily and apply antibiotic ointment and light gauze dressing. If no improvement of worsening of drainage in one week, call back.  Return to normal physical activity.  No pelvic restrictions except for IUD pelvic rest protocol. See procedure note.   Baby doing well.  Breastfeeding going well.  Signs of postpartum depression - discussed options.  Encouraged consideration of counseling and encouraged to consider SSRI. Zoloft Rx refilled. Pt denies need for further treatment at this time. Denies SI/HI  Contraception: contraceptive methods: IUD.  Insertion date: 23  Return in about 4 weeks (around 2024) for Ultrasound.     Diane Hairston, BAY  2023             Gynecologic Procedure Note        Procedure: IUD Insertion     Procedures    Pre procedure indication 1) Desires ParaGard  Post procedure indication 1) Desires ParaGard    NDC: Cassy  [address]-5  Lot #: [id number]  Exp Date: 2029  BH device    /82 (BP Location: Right arm, Patient Position: Sitting, Cuff Size: Adult)   Ht 177.8 cm (70\")   Wt 83.5 kg (184 lb)   LMP 2023 (Exact Date)   Breastfeeding Yes   BMI 26.40 kg/m²       The risks, benefits, and alternatives to paragard were explained at length with the patient. All her questions were answered and consents were signed.  Her LMP was recent pregnancy .  Urine Pregnancy Test was Negative.  Patient does not have an allergy to betadine or shellfish.    Time out: immediate members of the procedure team and patient agree to the following: correct patient, correct site, correct procedure to be performed. Diane Hairston, APRN      The patient was placed in a dorsal lithotomy position on " the examining table in stirrups. A bimanual exam confirmed the uterus was midposition. A speculum was inserted into the vagina and the cervix was brought into view.  The cervix was prepped with Betadine. The anterior lip of the cervix was then grasped with a single-tooth tenaculum. The endometrial cavity was then sounded to 9 centimeters. The sealed ParaGard package was opened and the IUD was removed in a sterile fashion.    The upper edge of the depth setting the flange was set at the uterine sound measurement. The  was then carefully advanced to the cervical canal into the uterus to the level of the fundus.  The slider was then retracted about 1 cm and deployed the device. The device was then gently advanced to the fundus. The IUD was then released by pulling the slider down all the way. The  was removed carefully from the uterus. The threads were then cut leaving 2-3 cm visible outside of the cervix.  The single-tooth tenaculum was removed from the anterior lip. Good hemostasis was noted.   All other instruments were removed from the vagina.       The patient tolerated the procedure very well with a mild amount of discomfort.  She was monitored for 5 minutes prior to discharge.      There were no complications.    The patient was counseled about the need to return in 4 weeks for IUD check.     She was counseled about the need to use a backup method of contraception such as condoms until her post insertion exam was performed. The patient verbalized understanding when the IUD will need to be removed/replaced. Written information was given to the patient.  The patient is counseled to contact us if she has any significant or increasing bleeding, pain, fever, chills, or other concerns. She is instructed to see a doctor right away if she believes that she may be pregnant at any time with the IUD in place.    Diane Hairston, APRN  12/14/2023

## 2023-12-18 ENCOUNTER — TELEMEDICINE (OUTPATIENT)
Dept: FAMILY MEDICINE CLINIC | Facility: TELEHEALTH | Age: 33
End: 2023-12-18
Payer: COMMERCIAL

## 2023-12-18 DIAGNOSIS — J22 LOWER RESPIRATORY INFECTION (E.G., BRONCHITIS, PNEUMONIA, PNEUMONITIS, PULMONITIS): Primary | ICD-10-CM

## 2023-12-18 RX ORDER — AZITHROMYCIN 250 MG/1
TABLET, FILM COATED ORAL
Qty: 6 TABLET | Refills: 0 | Status: SHIPPED | OUTPATIENT
Start: 2023-12-18

## 2023-12-18 RX ORDER — FLUTICASONE PROPIONATE 50 MCG
2 SPRAY, SUSPENSION (ML) NASAL DAILY
Qty: 16 ML | Refills: 0 | Status: SHIPPED | OUTPATIENT
Start: 2023-12-18
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
## 2023-12-18 NOTE — PATIENT INSTRUCTIONS
Continue to treat symptoms  Tylenol for pain and/or fever, stay hydrated and rest.     If symptoms worsen or do not improve follow up with your PCP or visit your nearest Urgent Care Center or ER.

## 2024-01-29 ENCOUNTER — TELEPHONE (OUTPATIENT)
Dept: OBSTETRICS AND GYNECOLOGY | Facility: CLINIC | Age: 34
End: 2024-01-29

## 2024-01-29 NOTE — TELEPHONE ENCOUNTER
Caller: Kia Allan    Relationship: Self    Best call back number: [phone]    What is the best time to reach you:     ANY    Who are you requesting to speak with (clinical staff, provider,  specific staff member):     DR DUMAS OR NURSE      What was the call regarding:     PT HAD BABY ON 11/04/2023    SHE NOW NEEDS A RETURN TO WORK NOTE (DATED 1/29/2024)    Is it okay if the provider responds through [employer]:     YES

## 2024-02-09 ENCOUNTER — TELEPHONE (OUTPATIENT)
Dept: OBSTETRICS AND GYNECOLOGY | Facility: CLINIC | Age: 34
End: 2024-02-09
Payer: COMMERCIAL

## 2024-02-09 NOTE — TELEPHONE ENCOUNTER
DISABILITY RECEIVED VIA FAX, $25 FEE NOT PAID. [employer] MESSAGE SENT TO INFORM PATIENT OF FEE

## (undated) DEVICE — PATIENT RETURN ELECTRODE, SINGLE-USE, CONTACT QUALITY MONITORING, ADULT, WITH 9FT CORD, FOR PATIENTS WEIGING OVER 33LBS. (15KG): Brand: MEGADYNE

## (undated) DEVICE — SUT VIC 2/0 CT1 27IN J339H BX/36

## (undated) DEVICE — TBG PENCL TELESCP MEGADYNE SMOKE EVAC 10FT

## (undated) DEVICE — SUT VIC 3/0 CT1 27IN DYED J338H

## (undated) DEVICE — APPL CHLORAPREP TINTED 26ML TEAL

## (undated) DEVICE — SUT MNCRYL 3/0 27L Y523H BX/36

## (undated) DEVICE — VIOLET BRAIDED (POLYGLACTIN 910), SYNTHETIC ABSORBABLE SUTURE: Brand: COATED VICRYL

## (undated) DEVICE — GLV SURG PREMIERPRO GAMMEX NEOPRN PF SZ7 GRN

## (undated) DEVICE — ADHS LIQ MASTISOL 2/3ML

## (undated) DEVICE — PK C/SECT 10

## (undated) DEVICE — SUT MNCRYL 1/0 CT1 36IN Y947H BX/36

## (undated) DEVICE — ADHS SKIN PREMIERPRO EXOFIN TOPICAL HI/VISC .5ML

## (undated) DEVICE — SOL IRR H2O BTL 1000ML STRL

## (undated) DEVICE — TRY SPINE BLCK WHITACRE 25G 3X5IN

## (undated) DEVICE — MAT PREVALON MOBL TRANSFR AIR W/PAD REPROC 39X81IN

## (undated) DEVICE — SOL IRR NACL 0.9PCT BT 1000ML

## (undated) DEVICE — TRAP FLD MINIVAC MEGADYNE 100ML

## (undated) DEVICE — 4-PORT MANIFOLD: Brand: NEPTUNE 2

## (undated) DEVICE — CLTH CLENS READYCLEANSE PERI CARE PK/5